# Patient Record
Sex: MALE | Race: WHITE | NOT HISPANIC OR LATINO | Employment: FULL TIME | URBAN - METROPOLITAN AREA
[De-identification: names, ages, dates, MRNs, and addresses within clinical notes are randomized per-mention and may not be internally consistent; named-entity substitution may affect disease eponyms.]

---

## 2018-05-05 ENCOUNTER — HOSPITAL ENCOUNTER (OUTPATIENT)
Facility: HOSPITAL | Age: 48
Discharge: HOME | End: 2018-05-06
Attending: EMERGENCY MEDICINE | Admitting: INTERNAL MEDICINE
Payer: COMMERCIAL

## 2018-05-05 DIAGNOSIS — R07.9 CHEST PAIN: ICD-10-CM

## 2018-05-05 DIAGNOSIS — Z72.0 TOBACCO ABUSE: Chronic | ICD-10-CM

## 2018-05-05 DIAGNOSIS — R07.9 ACUTE CHEST PAIN: Primary | ICD-10-CM

## 2018-05-05 DIAGNOSIS — E87.6 HYPOKALEMIA: ICD-10-CM

## 2018-05-05 DIAGNOSIS — R94.31 ST SEGMENT DEPRESSION: ICD-10-CM

## 2018-05-05 LAB
ALBUMIN SERPL BCP-MCNC: 4.3 G/DL
ALP SERPL-CCNC: 74 U/L
ALT SERPL W/O P-5'-P-CCNC: 37 U/L
ANION GAP SERPL CALC-SCNC: 12 MMOL/L
AST SERPL-CCNC: 67 U/L
BASOPHILS # BLD AUTO: 0.03 K/UL
BASOPHILS NFR BLD: 0.3 %
BILIRUB SERPL-MCNC: 0.4 MG/DL
BUN SERPL-MCNC: 16 MG/DL
CALCIUM SERPL-MCNC: 9.4 MG/DL
CHLORIDE SERPL-SCNC: 107 MMOL/L
CO2 SERPL-SCNC: 25 MMOL/L
CREAT SERPL-MCNC: 1.4 MG/DL
D DIMER PPP IA.FEU-MCNC: 0.26 MG/L FEU
DIFFERENTIAL METHOD: ABNORMAL
EOSINOPHIL # BLD AUTO: 0.7 K/UL
EOSINOPHIL NFR BLD: 6.1 %
ERYTHROCYTE [DISTWIDTH] IN BLOOD BY AUTOMATED COUNT: 12.2 %
EST. GFR  (AFRICAN AMERICAN): >60 ML/MIN/1.73 M^2
EST. GFR  (NON AFRICAN AMERICAN): >60 ML/MIN/1.73 M^2
GLUCOSE SERPL-MCNC: 168 MG/DL
HCT VFR BLD AUTO: 42.9 %
HGB BLD-MCNC: 15.1 G/DL
LYMPHOCYTES # BLD AUTO: 4.1 K/UL
LYMPHOCYTES NFR BLD: 37 %
MCH RBC QN AUTO: 30.6 PG
MCHC RBC AUTO-ENTMCNC: 35.2 G/DL
MCV RBC AUTO: 87 FL
MONOCYTES # BLD AUTO: 0.8 K/UL
MONOCYTES NFR BLD: 7.4 %
NEUTROPHILS # BLD AUTO: 5.4 K/UL
NEUTROPHILS NFR BLD: 49 %
PLATELET # BLD AUTO: 307 K/UL
PMV BLD AUTO: 9.2 FL
POTASSIUM SERPL-SCNC: 3.2 MMOL/L
PROT SERPL-MCNC: 6.9 G/DL
RBC # BLD AUTO: 4.94 M/UL
SODIUM SERPL-SCNC: 144 MMOL/L
TROPONIN I SERPL DL<=0.01 NG/ML-MCNC: <0.006 NG/ML
WBC # BLD AUTO: 11.08 K/UL

## 2018-05-05 PROCEDURE — 25000003 PHARM REV CODE 250: Performed by: EMERGENCY MEDICINE

## 2018-05-05 PROCEDURE — 85025 COMPLETE CBC W/AUTO DIFF WBC: CPT

## 2018-05-05 PROCEDURE — 84484 ASSAY OF TROPONIN QUANT: CPT

## 2018-05-05 PROCEDURE — 85379 FIBRIN DEGRADATION QUANT: CPT

## 2018-05-05 PROCEDURE — 99284 EMERGENCY DEPT VISIT MOD MDM: CPT | Mod: 25

## 2018-05-05 PROCEDURE — 93010 ELECTROCARDIOGRAM REPORT: CPT | Mod: 76,,, | Performed by: INTERNAL MEDICINE

## 2018-05-05 PROCEDURE — 93005 ELECTROCARDIOGRAM TRACING: CPT

## 2018-05-05 PROCEDURE — 36000 PLACE NEEDLE IN VEIN: CPT

## 2018-05-05 PROCEDURE — 80053 COMPREHEN METABOLIC PANEL: CPT

## 2018-05-05 PROCEDURE — G0378 HOSPITAL OBSERVATION PER HR: HCPCS

## 2018-05-05 RX ORDER — POTASSIUM CHLORIDE 20 MEQ/1
20 TABLET, EXTENDED RELEASE ORAL
Status: COMPLETED | OUTPATIENT
Start: 2018-05-06 | End: 2018-05-06

## 2018-05-05 RX ORDER — ASPIRIN 325 MG
325 TABLET ORAL
Status: COMPLETED | OUTPATIENT
Start: 2018-05-05 | End: 2018-05-05

## 2018-05-05 RX ORDER — METOPROLOL TARTRATE 25 MG/1
25 TABLET, FILM COATED ORAL
Status: COMPLETED | OUTPATIENT
Start: 2018-05-06 | End: 2018-05-06

## 2018-05-05 RX ADMIN — ASPIRIN 325 MG ORAL TABLET 325 MG: 325 PILL ORAL at 10:05

## 2018-05-06 VITALS
BODY MASS INDEX: 30.47 KG/M2 | WEIGHT: 205.69 LBS | DIASTOLIC BLOOD PRESSURE: 62 MMHG | RESPIRATION RATE: 16 BRPM | HEART RATE: 86 BPM | SYSTOLIC BLOOD PRESSURE: 109 MMHG | TEMPERATURE: 98 F | OXYGEN SATURATION: 95 % | HEIGHT: 69 IN

## 2018-05-06 PROBLEM — Z72.0 TOBACCO ABUSE: Chronic | Status: ACTIVE | Noted: 2018-05-06

## 2018-05-06 PROBLEM — M94.0 COSTOCHONDRITIS: Status: ACTIVE | Noted: 2018-05-06

## 2018-05-06 PROBLEM — R07.9 ACUTE CHEST PAIN: Status: ACTIVE | Noted: 2018-05-05

## 2018-05-06 PROBLEM — E87.6 HYPOKALEMIA: Status: ACTIVE | Noted: 2018-05-06

## 2018-05-06 LAB
ALBUMIN SERPL BCP-MCNC: 3.8 G/DL
ALP SERPL-CCNC: 67 U/L
ALT SERPL W/O P-5'-P-CCNC: 29 U/L
ANION GAP SERPL CALC-SCNC: 9 MMOL/L
AST SERPL-CCNC: 48 U/L
BASOPHILS # BLD AUTO: 0.03 K/UL
BASOPHILS NFR BLD: 0.3 %
BILIRUB SERPL-MCNC: 0.3 MG/DL
BUN SERPL-MCNC: 17 MG/DL
CALCIUM SERPL-MCNC: 9 MG/DL
CHLORIDE SERPL-SCNC: 109 MMOL/L
CHOLEST SERPL-MCNC: 183 MG/DL
CHOLEST/HDLC SERPL: 6.3 {RATIO}
CO2 SERPL-SCNC: 23 MMOL/L
CREAT SERPL-MCNC: 1 MG/DL
DIFFERENTIAL METHOD: NORMAL
EOSINOPHIL # BLD AUTO: 0.3 K/UL
EOSINOPHIL NFR BLD: 3.5 %
ERYTHROCYTE [DISTWIDTH] IN BLOOD BY AUTOMATED COUNT: 12.3 %
EST. GFR  (AFRICAN AMERICAN): >60 ML/MIN/1.73 M^2
EST. GFR  (NON AFRICAN AMERICAN): >60 ML/MIN/1.73 M^2
GLUCOSE SERPL-MCNC: 120 MG/DL
HCT VFR BLD AUTO: 42.6 %
HDLC SERPL-MCNC: 29 MG/DL
HDLC SERPL: 15.8 %
HGB BLD-MCNC: 14.3 G/DL
LDLC SERPL CALC-MCNC: 126.4 MG/DL
LYMPHOCYTES # BLD AUTO: 3.1 K/UL
LYMPHOCYTES NFR BLD: 31.3 %
MAGNESIUM SERPL-MCNC: 2.3 MG/DL
MCH RBC QN AUTO: 29.5 PG
MCHC RBC AUTO-ENTMCNC: 33.6 G/DL
MCV RBC AUTO: 88 FL
MONOCYTES # BLD AUTO: 0.5 K/UL
MONOCYTES NFR BLD: 4.6 %
NEUTROPHILS # BLD AUTO: 5.9 K/UL
NEUTROPHILS NFR BLD: 60.2 %
NONHDLC SERPL-MCNC: 154 MG/DL
PHOSPHATE SERPL-MCNC: 4.3 MG/DL
PLATELET # BLD AUTO: 273 K/UL
PMV BLD AUTO: 9.2 FL
POTASSIUM SERPL-SCNC: 4 MMOL/L
PROT SERPL-MCNC: 6.3 G/DL
RBC # BLD AUTO: 4.85 M/UL
SODIUM SERPL-SCNC: 141 MMOL/L
TRIGL SERPL-MCNC: 138 MG/DL
TROPONIN I SERPL DL<=0.01 NG/ML-MCNC: 0.01 NG/ML
TROPONIN I SERPL DL<=0.01 NG/ML-MCNC: <0.006 NG/ML
WBC # BLD AUTO: 9.81 K/UL

## 2018-05-06 PROCEDURE — 36415 COLL VENOUS BLD VENIPUNCTURE: CPT

## 2018-05-06 PROCEDURE — 80053 COMPREHEN METABOLIC PANEL: CPT

## 2018-05-06 PROCEDURE — 93005 ELECTROCARDIOGRAM TRACING: CPT

## 2018-05-06 PROCEDURE — 83735 ASSAY OF MAGNESIUM: CPT

## 2018-05-06 PROCEDURE — G0378 HOSPITAL OBSERVATION PER HR: HCPCS

## 2018-05-06 PROCEDURE — 84100 ASSAY OF PHOSPHORUS: CPT

## 2018-05-06 PROCEDURE — 84484 ASSAY OF TROPONIN QUANT: CPT

## 2018-05-06 PROCEDURE — 84484 ASSAY OF TROPONIN QUANT: CPT | Mod: 91

## 2018-05-06 PROCEDURE — 85025 COMPLETE CBC W/AUTO DIFF WBC: CPT

## 2018-05-06 PROCEDURE — 80061 LIPID PANEL: CPT

## 2018-05-06 PROCEDURE — 93010 ELECTROCARDIOGRAM REPORT: CPT | Mod: ,,, | Performed by: INTERNAL MEDICINE

## 2018-05-06 PROCEDURE — 25000003 PHARM REV CODE 250: Performed by: EMERGENCY MEDICINE

## 2018-05-06 RX ORDER — ACETAMINOPHEN 325 MG/1
650 TABLET ORAL EVERY 6 HOURS PRN
Status: DISCONTINUED | OUTPATIENT
Start: 2018-05-06 | End: 2018-05-06

## 2018-05-06 RX ORDER — ONDANSETRON 8 MG/1
8 TABLET, ORALLY DISINTEGRATING ORAL EVERY 8 HOURS PRN
Status: DISCONTINUED | OUTPATIENT
Start: 2018-05-06 | End: 2018-05-06 | Stop reason: HOSPADM

## 2018-05-06 RX ORDER — RAMELTEON 8 MG/1
8 TABLET ORAL NIGHTLY PRN
Status: DISCONTINUED | OUTPATIENT
Start: 2018-05-06 | End: 2018-05-06 | Stop reason: HOSPADM

## 2018-05-06 RX ORDER — ACETAMINOPHEN 325 MG/1
650 TABLET ORAL EVERY 4 HOURS PRN
Status: DISCONTINUED | OUTPATIENT
Start: 2018-05-06 | End: 2018-05-06

## 2018-05-06 RX ORDER — SODIUM CHLORIDE 0.9 % (FLUSH) 0.9 %
5 SYRINGE (ML) INJECTION
Status: DISCONTINUED | OUTPATIENT
Start: 2018-05-06 | End: 2018-05-06

## 2018-05-06 RX ORDER — IBUPROFEN 200 MG
1 TABLET ORAL DAILY
Status: DISCONTINUED | OUTPATIENT
Start: 2018-05-06 | End: 2018-05-06 | Stop reason: HOSPADM

## 2018-05-06 RX ORDER — ASPIRIN 325 MG
325 TABLET ORAL DAILY
Status: DISCONTINUED | OUTPATIENT
Start: 2018-05-06 | End: 2018-05-06 | Stop reason: HOSPADM

## 2018-05-06 RX ORDER — ACETAMINOPHEN 500 MG
500 TABLET ORAL EVERY 6 HOURS PRN
Status: DISCONTINUED | OUTPATIENT
Start: 2018-05-06 | End: 2018-05-06 | Stop reason: HOSPADM

## 2018-05-06 RX ORDER — ENOXAPARIN SODIUM 100 MG/ML
40 INJECTION SUBCUTANEOUS EVERY 24 HOURS
Status: DISCONTINUED | OUTPATIENT
Start: 2018-05-06 | End: 2018-05-06 | Stop reason: HOSPADM

## 2018-05-06 RX ORDER — AMOXICILLIN 250 MG
1 CAPSULE ORAL 2 TIMES DAILY PRN
Status: DISCONTINUED | OUTPATIENT
Start: 2018-05-06 | End: 2018-05-06 | Stop reason: HOSPADM

## 2018-05-06 RX ORDER — NITROGLYCERIN 0.4 MG/1
0.4 TABLET SUBLINGUAL EVERY 5 MIN PRN
Status: DISCONTINUED | OUTPATIENT
Start: 2018-05-06 | End: 2018-05-06 | Stop reason: HOSPADM

## 2018-05-06 RX ADMIN — METOPROLOL TARTRATE 25 MG: 25 TABLET ORAL at 12:05

## 2018-05-06 RX ADMIN — POTASSIUM CHLORIDE 20 MEQ: 1500 TABLET, EXTENDED RELEASE ORAL at 12:05

## 2018-05-06 NOTE — PLAN OF CARE
05/06/18 0949   Final Note   Assessment Type Final Discharge Note   Discharge Disposition Home   What phone number can be called within the next 1-3 days to see how you are doing after discharge? ([phone] )   Hospital Follow Up  Appt(s) scheduled? No  (Unable to schedule)   Discharge plans and expectations educations in teach back method with documentation complete? Yes   Right Care Referral Info   Post Acute Recommendation No Care

## 2018-05-06 NOTE — DISCHARGE SUMMARY
"[facility] Hospital Medicine  Discharge Summary      Patient Name: Roni Keen  MRN: [id number]  Admission Date: 2018  Hospital Length of Stay: 0 days  Discharge Date and Time:  2018 9:30 AM  Attending Physician: Whit Khan MD   Discharging Provider: CHEN Maxwell  Primary Care Provider: Jamie Malik MD      HPI:   Mr. Roni Keen is a 47 y.o. male with tobacco abuse who presents to [facility] ED with complaints of chest pain today.  The pain was left-sided without radiation, was sharp in quality, and was 5/10 in severity at its worst.  It started while he was driving and only lasted a few moments before resolving without intervention.  He felt some palpitations and dyspnea afterwards but denies any nausea, vomiting, fevers, chills, diaphoresis, hemoptysis, nor any lower extremity pain or swelling.  He denies any exacerbating or alleviating factors, and has never had similar issues in the past.  He does not have a personal history of heart disease but does say that it runs in his mother's family.  He did have a brother who  at the age of 45 years from an "enlarged heart" related to his steroid abuse.    * No surgery found *      Hospital Course:   47 y.o.  WM with no CAD history, who is a current everyday smoker with NKMHx who presented for evaluation of acute onset of non-radiating chest pain that was sharp, moderate intensity and exacerbated by light palpation at the bedside. ED was originally concerned about EKG changes in Lead III that appears artifact and subsequent EKGs (X2) have been normal since that time. Patient had serial negative troponin 1 levels other labs grossly normal except glucose is elevated. Lipid panel shows decent control, CXR without acute processes, vitals grossly normal. YVAN score -0-. His symptoms mostly MSK. Patient did mention that he is having periods of anxiety about 3-4 over the past couple of months. We spoke about redirecting " and refocusing. Also counseled against benzos but he states he did not want medication anyway. I have suggested he discuss with primary if the anxiety becomes too debilitating who may be able to start him on something. He was counseled on abnormal glucose and instructed on lifestyle changes. He should follow up with primary care in clinic and Cardiology outpatient.     Consults:     No new Assessment & Plan notes have been filed under this hospital service since the last note was generated.  Service: Hospital Medicine    Final Active Diagnoses:    Diagnosis Date Noted POA    PRINCIPAL PROBLEM:  Costochondritis [M94.0] 05/06/2018 Yes    Hypokalemia [E87.6] 05/06/2018 Yes    Tobacco abuse [Z72.0] 05/06/2018 Yes     Chronic    ST segment depression [R94.31]  Unknown    Acute chest pain [R07.9] 05/05/2018 Yes      Problems Resolved During this Admission:    Diagnosis Date Noted Date Resolved POA       Discharged Condition: stable    Disposition: Home or Self Care    Follow Up:  Follow-up Information     Jamie Malik MD.    Specialty:  Family Medicine  Contact information:  [address] [address]  [facility]  [facility] [id number] [phone]             Abraham Sotomayor MD.    Specialty:  Cardiology  Contact information:  [address] [place] [address] [phone]                 Patient Instructions:     Diet Cardiac     Activity as tolerated         Significant Diagnostic Studies: Labs:   CMP   Recent Labs  Lab 05/05/18 2230 05/06/18  0633    141   K 3.2* 4.0    109   CO2 25 23   * 120*   BUN 16 17   CREATININE 1.4 1.0   CALCIUM 9.4 9.0   PROT 6.9 6.3   ALBUMIN 4.3 3.8   BILITOT 0.4 0.3   ALKPHOS 74 67   AST 67* 48*   ALT 37 29   ANIONGAP 12 9   ESTGFRAFRICA >60 >60   EGFRNONAA >60 >60   , CBC   Recent Labs  Lab 05/05/18 2230 05/06/18  0633   WBC 11.08 9.81   HGB 15.1 14.3   HCT 42.9 42.6    273   , INR No results found for: INR, PROTIME,  Lipid Panel   Lab Results   Component Value Date    CHOL 183 05/06/2018    HDL 29 (L) 05/06/2018    LDLCALC 126.4 05/06/2018    TRIG 138 05/06/2018    CHOLHDL 15.8 (L) 05/06/2018    and Troponin   Recent Labs  Lab 05/06/18  0823   TROPONINI <0.006  <0.006       Pending Diagnostic Studies:     Procedure Component Value Units Date/Time    EKG 12-LEAD [[id number]]     Order Status:  Sent Lab Status:  No result     EKG 12-LEAD [[id number]]     Order Status:  Sent Lab Status:  No result     EKG 12-LEAD [[id number]]     Order Status:  Sent Lab Status:  No result          Medications:  Reconciled Home Medications:      Medication List      You have not been prescribed any medications.         Indwelling Lines/Drains at time of discharge:   Lines/Drains/Airways          No matching active lines, drains, or airways          Time spent on the discharge of patient: 45 minutes  Patient was seen and examined on the date of discharge and determined to be suitable for discharge.         GUI Weber, FNP-C  Hospitalist - Department of Hospital Medicine  [address] [place] [address]  Office [phone]; Pager [phone]

## 2018-05-06 NOTE — HOSPITAL COURSE
47 y.o.  WM with no CAD history, who is a current everyday smoker with NKMHx who presented for evaluation of acute onset of non-radiating chest pain that was sharp, moderate intensity and exacerbated by light palpation at the bedside. ED was originally concerned about EKG changes in Lead III that appears artifact and subsequent EKGs (X2) have been normal since that time. Patient had serial negative troponin 1 levels other labs grossly normal except glucose is elevated. Lipid panel shows decent control, CXR without acute processes, vitals grossly normal. YVAN score -0-. His symptoms mostly MSK. Patient did mention that he is having periods of anxiety about 3-4 over the past couple of months. We spoke about redirecting and refocusing. Also counseled against benzos but he states he did not want medication anyway. I have suggested he discuss with primary if the anxiety becomes too debilitating who may be able to start him on something. He was counseled on abnormal glucose and instructed on lifestyle changes. He should follow up with primary care in clinic and Cardiology outpatient.

## 2018-05-06 NOTE — ED PROVIDER NOTES
"Encounter Date: 2018    SCRIBE #1 NOTE: I, Faithbladimir Vasquez, am scribing for, and in the presence of, Gilberto Echeverria MD.       History     Chief Complaint   Patient presents with    Chest Pain     one incident of sharp chest; pt states he doesn't feel right and wants to get checked out; pt reports checking BP and it was high     CC: Chest Pain    HPI: This 47 y.o male, smoker, presents to the ED for an evaluation of an episode of acute onset left sided chest pain described as a "thump".  Patient also reports of associated dizziness and shortness of breath. Patient reports his symptom began 30 minutes prior to arrival and states they have since resolved.  Patient denies fever, chills, nausea, emesis, diarrhea, abdominal pain, cough, leg pain, leg swelling, nasal congestion, extremity numbness, extremity weakness, or any other associated symptoms.  Patient reports for the past 2 months, he has been having intermittent episodes of anxiety.  Patient reports he typically does not have elevated blood pressures and reports typical readings include 130/89.  Patient reports of a brother who suddenly  at the age of 45 from cardiac related illnesses.  No prior tx.  No alleviating factors.      The history is provided by the patient. No  was used.     Review of patient's allergies indicates:  No Known Allergies  History reviewed. No pertinent past medical history.  History reviewed. No pertinent surgical history.  History reviewed. No pertinent family history.  Social History   Substance Use Topics    Smoking status: Current Every Day Smoker    Smokeless tobacco: Never Used    Alcohol use No     Review of Systems   Constitutional: Negative for chills and fever.   HENT: Negative for ear pain and sore throat.    Eyes: Negative for pain.   Respiratory: Positive for shortness of breath. Negative for cough.    Cardiovascular: Positive for chest pain. Negative for palpitations and leg swelling. " "  Gastrointestinal: Negative for abdominal pain, diarrhea, nausea and vomiting.   Genitourinary: Negative for dysuria.   Musculoskeletal: Negative for back pain and neck pain.        (-) arm or leg problems   Skin: Negative for rash.   Neurological: Positive for dizziness. Negative for weakness, numbness and headaches.       Physical Exam     Initial Vitals [05/05/18 2214]   BP Pulse Resp Temp SpO2   (!) 171/81 106 18 98.1 °F (36.7 °C) 98 %      MAP       111         Physical Exam    Nursing note and vitals reviewed.  Constitutional: Vital signs are normal. He appears well-developed and well-nourished. He is active.  Non-toxic appearance. No distress.   HENT:   Head: Normocephalic and atraumatic.   Eyes: EOM are normal.   Neck: Trachea normal. Neck supple.   Cardiovascular: Regular rhythm and normal heart sounds. Tachycardia present.    Pulmonary/Chest: Breath sounds normal. No respiratory distress.   Abdominal: Soft. Normal appearance and bowel sounds are normal. He exhibits no distension. There is no tenderness.   Musculoskeletal: Normal range of motion. He exhibits no edema.   Neurological: He is alert.   Skin: Skin is warm, dry and intact.   Psychiatric: He has a normal mood and affect.         ED Course   Procedures  Labs Reviewed - No data to display  EKG Readings: (Independently Interpreted)   Sinus tachycardia with a rate of 106. 0.5 mm ST depression in V3-V6. No ST segment elevation. Normal axis. Normal intervals.    11:48 PM 2nd EKG rate 79, normal sinus rhythm, no ST segment elevation or depression, normal intervals normal axis and no ST segment depression that was seen on the prior EKG.          Medical Decision Making:   History:   Old Medical Records: I decided to obtain old medical records.  Initial Assessment:   47 y.o male, smoker, presents to the ED for an evaluation of an episode of acute onset left sided chest pain described as a "thump".  Patient also reports of associated dizziness and " shortness of breath. Patient reports his symptom began 30 minutes prior to arrival and states they have since resolved.   Differential Diagnosis:   Pulmonary embolism, Myocardial infarction, Pneumonia, Pneumothorax, Anxiety, Muscle Strain  Clinical Tests:   Lab Tests: Ordered and Reviewed  Radiological Study: Ordered and Reviewed  Medical Tests: Ordered and Reviewed  ED Management:  While the ED, the patient will be administered a 325 mg ASA. Disposition dependent upon results.  Anticipate no abnormal findings, but will consider possible admission.  The patient has EKG changes that improved with improvement in his symptoms.  He will be admitted for further evaluation stratification of his chest pain. He is chest pain-free at this time.  I will administer him metoprolol for his hypertension.  Spoke with Dr. Tinoco who agrees with the plan.             Scribe Attestation:   Scribe #1: I performed the above scribed service and the documentation accurately describes the services I performed. I attest to the accuracy of the note.    Attending Attestation:           Physician Attestation for Scribe:  Physician Attestation Statement for Scribe #1: I, Gilberto Echeverria MD, reviewed documentation, as scribed by Faith Vasquez in my presence, and it is both accurate and complete.                    Clinical Impression:   The primary encounter diagnosis was ST segment depression. A diagnosis of Chest pain was also pertinent to this visit.                           Gilberto Echeverria MD  05/05/18 7897       Gilberto Echeverria MD  05/05/18 6832

## 2018-05-06 NOTE — NURSING
Pt received from ED assisted by transport via wheelchair. Ambulated from wheelchair to bed and bed locked, lowered, SR up x2. Vitals obtained and documented. Tele monitor initiated. Pt is AAO x4. Resp are even, unlabored, clear on room air. Pt is presenting SR on tele monitor. Abd is contour, active x4 quads and reports having last BM on 5/5. Skin is clean, dry and intact. 18g PIV to RAC saline locked. Pt denied pain at current time and is in NAD. Assessment completed and documented. See doc flow sheet. Plan of care reviewed with pt and pt verbalized understanding. Call light placed within reach. Will continue to monitor pt closely.

## 2018-05-06 NOTE — NURSING
Patient removed IV, catheter intact. No complications to site noted.     Telemetry monitoring discontinued.     Discharge instructions explained and given to patient. Questions and concerns addressed.     Steady gait noted with ambulation. No falls or harm noted during stay.

## 2018-05-06 NOTE — PLAN OF CARE
Problem: Patient Care Overview  Goal: Plan of Care Review   05/06/18 0202   Coping/Psychosocial   Plan Of Care Reviewed With patient   Pt remained free of falls during current shift. Denied pain and did not receive any prn pain medications. Monitoring pt's troponin's every 6 hours which are: <0.006 & 0.008. Pt remained NPO since midnight.  Plan of care and fall precautions reviewed with pt and verbalized understanding. Bed locked, lowered, SR up x2 and call light placed within reach.

## 2018-05-06 NOTE — SUBJECTIVE & OBJECTIVE
History reviewed. No pertinent past medical history.    Past Surgical History:   Procedure Laterality Date    ROTATOR CUFF REPAIR Right        Review of patient's allergies indicates:  No Known Allergies    No current facility-administered medications on file prior to encounter.      No current outpatient prescriptions on file prior to encounter.     Family History     None        Social History Main Topics    Smoking status: Current Every Day Smoker     Packs/day: 1.00    Smokeless tobacco: Never Used    Alcohol use No    Drug use: No    Sexual activity: Not on file     Review of Systems   Constitutional: Negative for activity change, appetite change, chills, diaphoresis, fatigue, fever and unexpected weight change.   HENT: Negative.    Eyes: Negative.    Respiratory: Positive for shortness of breath. Negative for cough, chest tightness and wheezing.    Cardiovascular: Positive for chest pain. Negative for palpitations and leg swelling.   Gastrointestinal: Negative for abdominal distention, abdominal pain, blood in stool, constipation, diarrhea, nausea and vomiting.   Genitourinary: Negative for dysuria and hematuria.   Musculoskeletal: Negative.    Skin: Negative.    Neurological: Negative for dizziness, seizures, syncope, weakness and light-headedness.   Psychiatric/Behavioral: Negative.      Objective:     Vital Signs (Most Recent):  Temp: 98.6 °F (37 °C) (05/06/18 0204)  Pulse: 64 (05/06/18 0204)  Resp: 18 (05/06/18 0204)  BP: 137/84 (05/06/18 0204)  SpO2: 97 % (05/06/18 0204) Vital Signs (24h Range):  Temp:  [98.1 °F (36.7 °C)-98.9 °F (37.2 °C)] 98.6 °F (37 °C)  Pulse:  [] 64  Resp:  [14-18] 18  SpO2:  [95 %-100 %] 97 %  BP: (137-171)/(76-84) 137/84     Weight: 93.3 kg (205 lb 11 oz)  Body mass index is 30.38 kg/m².    Physical Exam   Constitutional: He is oriented to person, place, and time. He appears well-developed and well-nourished. No distress.   HENT:   Head: Normocephalic and atraumatic.    Right Ear: External ear normal.   Left Ear: External ear normal.   Nose: Nose normal.   Eyes: Right eye exhibits no discharge. Left eye exhibits no discharge.   Neck: Normal range of motion.   Cardiovascular: Normal rate, regular rhythm, normal heart sounds and intact distal pulses.  Exam reveals no gallop and no friction rub.    No murmur heard.  Pulmonary/Chest: Effort normal and breath sounds normal. No respiratory distress. He has no wheezes. He has no rales. He exhibits no tenderness.   Abdominal: Soft. Bowel sounds are normal. He exhibits no distension. There is no tenderness. There is no rebound and no guarding.   Musculoskeletal: Normal range of motion. He exhibits no edema.   Neurological: He is alert and oriented to person, place, and time.   Skin: Skin is warm and dry. He is not diaphoretic. No erythema.   Psychiatric: He has a normal mood and affect. His behavior is normal. Judgment and thought content normal.   Nursing note and vitals reviewed.          Significant Labs: All pertinent labs within the past 24 hours have been reviewed.    Significant Imaging: I have reviewed and interpreted all pertinent imaging results/findings within the past 24 hours.

## 2018-05-06 NOTE — ED TRIAGE NOTES
Pt presents to ED with c/o chest pain and reports of generalized illness. Pt reports one incident of stabbing chest pain and reports of generally not feeling well.

## 2018-05-06 NOTE — ASSESSMENT & PLAN NOTE
Patient currently with no chest pain.  HEART score of 2.  Will plan to obtain serial cardiac markers, which thus far have been negative.  Place in observation on telemetry.  There was some concern about anterolateral ST-segment depressions.  I have reviewed both EKGs and it reveals normal sinus rhythm without any ischemic findings.  Chest X-ray is without chest pain mimickers.  Will also administer supplemental oxygen and aspirin.  Will have as-needed nitroglycerin and morphine available.

## 2018-05-06 NOTE — HPI
"Mr. Roni Keen is a 47 y.o. male with tobacco abuse who presents to [facility] ED with complaints of chest pain today.  The pain was left-sided without radiation, was sharp in quality, and was 5/10 in severity at its worst.  It started while he was driving and only lasted a few moments before resolving without intervention.  He felt some palpitations and dyspnea afterwards but denies any nausea, vomiting, fevers, chills, diaphoresis, hemoptysis, nor any lower extremity pain or swelling.  He denies any exacerbating or alleviating factors, and has never had similar issues in the past.  He does not have a personal history of heart disease but does say that it runs in his mother's family.  He did have a brother who  at the age of 45 years from an "enlarged heart" related to his steroid abuse.  "

## 2018-05-06 NOTE — H&P
"[facility] Hospital Medicine  History & Physical    Patient Name: Roni Keen  MRN: [id number]  Admission Date: 2018  Attending Physician: Whit Khan MD   Primary Care Provider: Jamie Malik MD         Patient information was obtained from patient.     Subjective:     Principal Problem:Acute chest pain    Chief Complaint: Chest pain today.    HPI: Mr. Roni Keen is a 47 y.o. male with tobacco abuse who presents to [facility] ED with complaints of chest pain today.  The pain was left-sided without radiation, was sharp in quality, and was 5/10 in severity at its worst.  It started while he was driving and only lasted a few moments before resolving without intervention.  He felt some palpitations and dyspnea afterwards but denies any nausea, vomiting, fevers, chills, diaphoresis, hemoptysis, nor any lower extremity pain or swelling.  He denies any exacerbating or alleviating factors, and has never had similar issues in the past.  He does not have a personal history of heart disease but does say that it runs in his mother's family.  He did have a brother who  at the age of 45 years from an "enlarged heart" related to his steroid abuse.    Chart Review:  Patient has not had any recent hospitalizations within the system.    Outpatient Follow-Up  Date of Visit Physician Service   2015 Jamie Malik MD Primary Care     History reviewed. No pertinent past medical history.    Past Surgical History:   Procedure Laterality Date    ROTATOR CUFF REPAIR Right        Review of patient's allergies indicates:  No Known Allergies    No current facility-administered medications on file prior to encounter.      No current outpatient prescriptions on file prior to encounter.     Family History     None        Social History Main Topics    Smoking status: Current Every Day Smoker     Packs/day: 1.00    Smokeless tobacco: Never Used    Alcohol use No    Drug use: No    Sexual " activity: Not on file     Review of Systems   Constitutional: Negative for activity change, appetite change, chills, diaphoresis, fatigue, fever and unexpected weight change.   HENT: Negative.    Eyes: Negative.    Respiratory: Positive for shortness of breath. Negative for cough, chest tightness and wheezing.    Cardiovascular: Positive for chest pain. Negative for palpitations and leg swelling.   Gastrointestinal: Negative for abdominal distention, abdominal pain, blood in stool, constipation, diarrhea, nausea and vomiting.   Genitourinary: Negative for dysuria and hematuria.   Musculoskeletal: Negative.    Skin: Negative.    Neurological: Negative for dizziness, seizures, syncope, weakness and light-headedness.   Psychiatric/Behavioral: Negative.      Objective:     Vital Signs (Most Recent):  Temp: 98.6 °F (37 °C) (05/06/18 0204)  Pulse: 64 (05/06/18 0204)  Resp: 18 (05/06/18 0204)  BP: 137/84 (05/06/18 0204)  SpO2: 97 % (05/06/18 0204) Vital Signs (24h Range):  Temp:  [98.1 °F (36.7 °C)-98.9 °F (37.2 °C)] 98.6 °F (37 °C)  Pulse:  [] 64  Resp:  [14-18] 18  SpO2:  [95 %-100 %] 97 %  BP: (137-171)/(76-84) 137/84     Weight: 93.3 kg (205 lb 11 oz)  Body mass index is 30.38 kg/m².    Physical Exam   Constitutional: He is oriented to person, place, and time. He appears well-developed and well-nourished. No distress.   HENT:   Head: Normocephalic and atraumatic.   Right Ear: External ear normal.   Left Ear: External ear normal.   Nose: Nose normal.   Eyes: Right eye exhibits no discharge. Left eye exhibits no discharge.   Neck: Normal range of motion.   Cardiovascular: Normal rate, regular rhythm, normal heart sounds and intact distal pulses.  Exam reveals no gallop and no friction rub.    No murmur heard.  Pulmonary/Chest: Effort normal and breath sounds normal. No respiratory distress. He has no wheezes. He has no rales. He exhibits no tenderness.   Abdominal: Soft. Bowel sounds are normal. He exhibits no  distension. There is no tenderness. There is no rebound and no guarding.   Musculoskeletal: Normal range of motion. He exhibits no edema.   Neurological: He is alert and oriented to person, place, and time.   Skin: Skin is warm and dry. He is not diaphoretic. No erythema.   Psychiatric: He has a normal mood and affect. His behavior is normal. Judgment and thought content normal.   Nursing note and vitals reviewed.          Significant Labs: All pertinent labs within the past 24 hours have been reviewed.    Significant Imaging: I have reviewed and interpreted all pertinent imaging results/findings within the past 24 hours.    Assessment/Plan:     * Acute chest pain    Patient currently with no chest pain.  HEART score of 2.  Will plan to obtain serial cardiac markers, which thus far have been negative.  Place in observation on telemetry.  There was some concern about anterolateral ST-segment depressions.  I have reviewed both EKGs and it reveals normal sinus rhythm without any ischemic findings.  Chest X-ray is without chest pain mimickers.  Will also administer supplemental oxygen and aspirin.  Will have as-needed nitroglycerin and morphine available.        Hypokalemia    Will replete orally and recheck his potassium level in the morning.        Tobacco abuse    Patient was counseled on smoking cessation and he will be provided a nicotine transdermal patch applied while inpatient.  Will provide additional smoking cessation counseling prior to discharge.          VTE Risk Mitigation         Ordered     enoxaparin injection 40 mg  Daily      05/06/18 0242     IP VTE HIGH RISK PATIENT  Once      05/06/18 0242           Lizzie Tinoco M.D.  Staff Nocturnist  Department of Hospital Medicine  [facility]  Pager: [phone]

## 2019-10-01 ENCOUNTER — OFFICE VISIT (OUTPATIENT)
Dept: FAMILY MEDICINE | Facility: CLINIC | Age: 49
End: 2019-10-01
Payer: COMMERCIAL

## 2019-10-01 VITALS
BODY MASS INDEX: 31.33 KG/M2 | SYSTOLIC BLOOD PRESSURE: 138 MMHG | WEIGHT: 211.56 LBS | TEMPERATURE: 98 F | RESPIRATION RATE: 18 BRPM | HEIGHT: 69 IN | OXYGEN SATURATION: 96 % | HEART RATE: 79 BPM | DIASTOLIC BLOOD PRESSURE: 86 MMHG

## 2019-10-01 DIAGNOSIS — I10 BENIGN ESSENTIAL HTN: ICD-10-CM

## 2019-10-01 DIAGNOSIS — Z00.00 LABORATORY EXAM ORDERED AS PART OF ROUTINE GENERAL MEDICAL EXAMINATION: ICD-10-CM

## 2019-10-01 DIAGNOSIS — J30.9 ALLERGIC RHINITIS, UNSPECIFIED SEASONALITY, UNSPECIFIED TRIGGER: Primary | ICD-10-CM

## 2019-10-01 DIAGNOSIS — J45.901 EXACERBATION OF ASTHMA, UNSPECIFIED ASTHMA SEVERITY, UNSPECIFIED WHETHER PERSISTENT: ICD-10-CM

## 2019-10-01 DIAGNOSIS — E66.9 OBESITY (BMI 30-39.9): ICD-10-CM

## 2019-10-01 PROCEDURE — 99204 PR OFFICE/OUTPT VISIT, NEW, LEVL IV, 45-59 MIN: ICD-10-PCS | Mod: 25,S$GLB,, | Performed by: NURSE PRACTITIONER

## 2019-10-01 PROCEDURE — 99204 OFFICE O/P NEW MOD 45 MIN: CPT | Mod: 25,S$GLB,, | Performed by: NURSE PRACTITIONER

## 2019-10-01 PROCEDURE — 96372 PR INJECTION,THERAP/PROPH/DIAG2ST, IM OR SUBCUT: ICD-10-PCS | Mod: S$GLB,,, | Performed by: NURSE PRACTITIONER

## 2019-10-01 PROCEDURE — 96372 THER/PROPH/DIAG INJ SC/IM: CPT | Mod: S$GLB,,, | Performed by: NURSE PRACTITIONER

## 2019-10-01 PROCEDURE — 3008F PR BODY MASS INDEX (BMI) DOCUMENTED: ICD-10-PCS | Mod: CPTII,S$GLB,, | Performed by: NURSE PRACTITIONER

## 2019-10-01 PROCEDURE — 3008F BODY MASS INDEX DOCD: CPT | Mod: CPTII,S$GLB,, | Performed by: NURSE PRACTITIONER

## 2019-10-01 RX ORDER — PREDNISONE 20 MG/1
TABLET ORAL
Qty: 18 TABLET | Refills: 0 | Status: SHIPPED | OUTPATIENT
Start: 2019-10-01 | End: 2022-03-02

## 2019-10-01 RX ORDER — OLMESARTAN MEDOXOMIL 20 MG/1
20 TABLET ORAL DAILY
Qty: 90 TABLET | Refills: 3 | Status: SHIPPED | OUTPATIENT
Start: 2019-10-01 | End: 2022-03-02

## 2019-10-01 RX ORDER — BETAMETHASONE SODIUM PHOSPHATE AND BETAMETHASONE ACETATE 3; 3 MG/ML; MG/ML
6 INJECTION, SUSPENSION INTRA-ARTICULAR; INTRALESIONAL; INTRAMUSCULAR; SOFT TISSUE
Status: COMPLETED | OUTPATIENT
Start: 2019-10-01 | End: 2019-10-01

## 2019-10-01 RX ORDER — MONTELUKAST SODIUM 10 MG/1
10 TABLET ORAL NIGHTLY
Qty: 30 TABLET | Refills: 4 | Status: SHIPPED | OUTPATIENT
Start: 2019-10-01 | End: 2019-10-31

## 2019-10-01 RX ADMIN — BETAMETHASONE SODIUM PHOSPHATE AND BETAMETHASONE ACETATE 6 MG: 3; 3 INJECTION, SUSPENSION INTRA-ARTICULAR; INTRALESIONAL; INTRAMUSCULAR; SOFT TISSUE at 04:10

## 2019-10-01 NOTE — PROGRESS NOTES
"Subjective:       Patient ID: Roni Keen is a 49 y.o. male.    Chief Complaint: URI    The patient is here with complaints of URI symptoms.  Patient has a long history of asthma and allergies as a child.  He does tell me he has not suffered with asthma as an adult but he has significant allergies all of the time with a constant postnasal drip.  When he was younger he was on immunotherapy.  He is currently taking Claritin daily.  He does tell me that he does monitor his blood pressure at home and it has been elevated.  He often times have spikes up to 170s over 80s to 90s.  He does feel "bad" when his blood pressure is high.  He denies any vision changes.  No chest pain or palpitations.    He does have a history of an elevated cholesterol but tells me he has had this checked again since the last time that is recorded in this chart.  He knows it was still elevated.  He does not exercise on a regular basis.    The 10-year ASCVD risk score (Marionmarni POWERS Jr., et al., 2013) is: 11.5%    Values used to calculate the score:      Age: 49 years      Sex: Male      Is Non- : No      Diabetic: No      Tobacco smoker: Yes      Systolic Blood Pressure: 138 mmHg      Is BP treated: No      HDL Cholesterol: 29 mg/dL      Total Cholesterol: 183 mg/dL    URI    This is a new problem. The current episode started 1 to 4 weeks ago. Associated symptoms include congestion, coughing, headaches, rhinorrhea, sneezing and wheezing. Pertinent negatives include no abdominal pain, chest pain, diarrhea, dysuria, nausea, sore throat or vomiting.     Review of Systems   Constitutional: Positive for appetite change and fatigue.   HENT: Positive for congestion, postnasal drip, rhinorrhea and sneezing. Negative for sinus pressure and sore throat.    Eyes: Negative for pain and redness.   Respiratory: Positive for cough and wheezing. Negative for choking, chest tightness and shortness of breath.    Cardiovascular: Negative for " "chest pain and palpitations.   Gastrointestinal: Negative for abdominal distention, abdominal pain, constipation, diarrhea, nausea and vomiting.   Endocrine: Negative for polydipsia and polyphagia.   Genitourinary: Negative for dysuria and hematuria.   Musculoskeletal: Negative for arthralgias, joint swelling and myalgias.   Skin: Negative for color change and pallor.   Neurological: Positive for headaches. Negative for dizziness and light-headedness.       Past medical, surgical, family and social history reviewed.  Objective:     Vitals:    10/01/19 1549   BP: 138/86   Pulse: 79   Resp: 18   Temp: 98.2 °F (36.8 °C)   TempSrc: Oral   SpO2: 96%   Weight: 95.9 kg (211 lb 8.5 oz)   Height: 5' 9" (1.753 m)   PainSc: 0-No pain     Body mass index is 31.24 kg/m².     Physical Exam   Constitutional: He is oriented to person, place, and time. He appears well-developed and well-nourished.   HENT:   Head: Normocephalic and atraumatic.   Right Ear: External ear normal. Tympanic membrane mobility is abnormal.   Left Ear: External ear normal. Tympanic membrane mobility is abnormal.   Nose: Mucosal edema and rhinorrhea present.   Mouth/Throat: Posterior oropharyngeal edema present. No posterior oropharyngeal erythema.   Eyes: Pupils are equal, round, and reactive to light. Conjunctivae are normal.   Neck: Normal range of motion. Neck supple. No tracheal deviation present.   Cardiovascular: Normal rate and regular rhythm. Exam reveals no gallop and no friction rub.   No murmur heard.  Pulmonary/Chest: Effort normal. No stridor. No respiratory distress. He has wheezes. He has no rales.   Musculoskeletal: Normal range of motion.   Lymphadenopathy:     He has no cervical adenopathy.   Neurological: He is alert and oriented to person, place, and time.   Skin: Skin is warm and dry.   Psychiatric: He has a normal mood and affect. His behavior is normal. Judgment and thought content normal.       Assessment:       1. Allergic rhinitis, " unspecified seasonality, unspecified trigger    2. Exacerbation of asthma, unspecified asthma severity, unspecified whether persistent    3. Laboratory exam ordered as part of routine general medical examination    4. Benign essential HTN    5. Obesity (BMI 30-39.9)        Plan:       Roni was seen today for uri.    Diagnoses and all orders for this visit:    Allergic rhinitis, unspecified seasonality, unspecified trigger  -     montelukast (SINGULAIR) 10 mg tablet; Take 1 tablet (10 mg total) by mouth every evening.    Exacerbation of asthma, unspecified asthma severity, unspecified whether persistent  -     betamethasone acetate-betamethasone sodium phosphate injection 6 mg  -     predniSONE (DELTASONE) 20 MG tablet; Take 3 tablets daily for 3 days, then 2 tablets daily for 3 days, then 1 tablet daily for 3 days    Laboratory exam ordered as part of routine general medical examination  -     CBC auto differential; Future  -     Comprehensive metabolic panel; Future  -     Hemoglobin A1c; Future  -     Lipid panel; Future  -     TSH; Future    Benign essential HTN-new dx  -     olmesartan (BENICAR) 20 MG tablet; Take 1 tablet (20 mg total) by mouth once daily.    Obesity (BMI 30-39.9)    Weight loss discussed.  Patient will follow up with me in the next few months regarding his diagnosis of hypertension.

## 2019-10-01 NOTE — LETTER
October 1, 2019      [facility]  [address] [address] [address]  [facility] [address]  Phone: [phone]  Fax: [phone]       Patient: Roni Keen   YOB: 1970  Date of Visit: 10/01/2019    To Whom It May Concern:    Alex Keen  was at [facility] on 10/01/2019. He may return to work on 10/03/2019 with no restrictions. If you have any questions or concerns, or if I can be of further assistance, please do not hesitate to contact me.    Sincerely,        Rocio Grijalva LPN

## 2019-10-08 ENCOUNTER — LAB VISIT (OUTPATIENT)
Dept: LAB | Facility: HOSPITAL | Age: 49
End: 2019-10-08
Attending: NURSE PRACTITIONER
Payer: COMMERCIAL

## 2019-10-08 DIAGNOSIS — Z00.00 LABORATORY EXAM ORDERED AS PART OF ROUTINE GENERAL MEDICAL EXAMINATION: ICD-10-CM

## 2019-10-08 LAB
ALBUMIN SERPL BCP-MCNC: 4.4 G/DL (ref 3.5–5.2)
ALP SERPL-CCNC: 83 U/L (ref 55–135)
ALT SERPL W/O P-5'-P-CCNC: 29 U/L (ref 10–44)
ANION GAP SERPL CALC-SCNC: 8 MMOL/L (ref 8–16)
AST SERPL-CCNC: 18 U/L (ref 10–40)
BASOPHILS # BLD AUTO: 0.05 K/UL (ref 0–0.2)
BASOPHILS NFR BLD: 0.5 % (ref 0–1.9)
BILIRUB SERPL-MCNC: 0.3 MG/DL (ref 0.1–1)
BUN SERPL-MCNC: 18 MG/DL (ref 6–20)
CALCIUM SERPL-MCNC: 9.7 MG/DL (ref 8.7–10.5)
CHLORIDE SERPL-SCNC: 103 MMOL/L (ref 95–110)
CHOLEST SERPL-MCNC: 221 MG/DL (ref 120–199)
CHOLEST/HDLC SERPL: 5.7 {RATIO} (ref 2–5)
CO2 SERPL-SCNC: 29 MMOL/L (ref 23–29)
CREAT SERPL-MCNC: 1.1 MG/DL (ref 0.5–1.4)
DIFFERENTIAL METHOD: ABNORMAL
EOSINOPHIL # BLD AUTO: 0.4 K/UL (ref 0–0.5)
EOSINOPHIL NFR BLD: 3.6 % (ref 0–8)
ERYTHROCYTE [DISTWIDTH] IN BLOOD BY AUTOMATED COUNT: 12.2 % (ref 11.5–14.5)
EST. GFR  (AFRICAN AMERICAN): >60 ML/MIN/1.73 M^2
EST. GFR  (NON AFRICAN AMERICAN): >60 ML/MIN/1.73 M^2
ESTIMATED AVG GLUCOSE: 151 MG/DL (ref 68–131)
GLUCOSE SERPL-MCNC: 119 MG/DL (ref 70–110)
HBA1C MFR BLD HPLC: 6.9 % (ref 4–5.6)
HCT VFR BLD AUTO: 49.9 % (ref 40–54)
HDLC SERPL-MCNC: 39 MG/DL (ref 40–75)
HDLC SERPL: 17.6 % (ref 20–50)
HGB BLD-MCNC: 16.5 G/DL (ref 14–18)
IMM GRANULOCYTES # BLD AUTO: 0.05 K/UL (ref 0–0.04)
IMM GRANULOCYTES NFR BLD AUTO: 0.5 % (ref 0–0.5)
LDLC SERPL CALC-MCNC: 149.4 MG/DL (ref 63–159)
LYMPHOCYTES # BLD AUTO: 3 K/UL (ref 1–4.8)
LYMPHOCYTES NFR BLD: 30 % (ref 18–48)
MCH RBC QN AUTO: 29.8 PG (ref 27–31)
MCHC RBC AUTO-ENTMCNC: 33.1 G/DL (ref 32–36)
MCV RBC AUTO: 90 FL (ref 82–98)
MONOCYTES # BLD AUTO: 0.6 K/UL (ref 0.3–1)
MONOCYTES NFR BLD: 5.7 % (ref 4–15)
NEUTROPHILS # BLD AUTO: 6 K/UL (ref 1.8–7.7)
NEUTROPHILS NFR BLD: 59.7 % (ref 38–73)
NONHDLC SERPL-MCNC: 182 MG/DL
NRBC BLD-RTO: 0 /100 WBC
PLATELET # BLD AUTO: 304 K/UL (ref 150–350)
PMV BLD AUTO: 9.3 FL (ref 9.2–12.9)
POTASSIUM SERPL-SCNC: 5 MMOL/L (ref 3.5–5.1)
PROT SERPL-MCNC: 7.4 G/DL (ref 6–8.4)
RBC # BLD AUTO: 5.53 M/UL (ref 4.6–6.2)
SODIUM SERPL-SCNC: 140 MMOL/L (ref 136–145)
TRIGL SERPL-MCNC: 163 MG/DL (ref 30–150)
TSH SERPL DL<=0.005 MIU/L-ACNC: 2.36 UIU/ML (ref 0.4–4)
WBC # BLD AUTO: 10 K/UL (ref 3.9–12.7)

## 2019-10-08 PROCEDURE — 84443 ASSAY THYROID STIM HORMONE: CPT

## 2019-10-08 PROCEDURE — 36415 COLL VENOUS BLD VENIPUNCTURE: CPT | Mod: PO

## 2019-10-08 PROCEDURE — 85025 COMPLETE CBC W/AUTO DIFF WBC: CPT

## 2019-10-08 PROCEDURE — 80061 LIPID PANEL: CPT

## 2019-10-08 PROCEDURE — 80053 COMPREHEN METABOLIC PANEL: CPT

## 2019-10-08 PROCEDURE — 83036 HEMOGLOBIN GLYCOSYLATED A1C: CPT

## 2019-10-11 ENCOUNTER — TELEPHONE (OUTPATIENT)
Dept: FAMILY MEDICINE | Facility: CLINIC | Age: 49
End: 2019-10-11

## 2019-10-11 NOTE — TELEPHONE ENCOUNTER
Please book the patient a follow-up visit with me extended appointment to review all blood work.  His hemoglobin A1c was 6.9 which is technically consistent with diabetes but it is controlled.  I would like to review this and discuss it with them.

## 2019-10-11 NOTE — TELEPHONE ENCOUNTER
Advised pt of provider message, verbalized understanding and stated he would call back to schedule an extended appointment

## 2020-12-28 ENCOUNTER — TELEPHONE (OUTPATIENT)
Dept: FAMILY MEDICINE | Facility: CLINIC | Age: 50
End: 2020-12-28

## 2020-12-28 NOTE — TELEPHONE ENCOUNTER
"----- Message from Slim Medina sent at 12/28/2020 12:10 PM CST -----  Regarding: pt wife floridalma  Type: Needs Medical Advice    Who Called:  wife    Best Call Back Number: [phone]   Additional Information: pt needs to see neurologist. Wife wants opinion on "reputable" neurologist. Please call to discuss.      "

## 2020-12-28 NOTE — TELEPHONE ENCOUNTER
Patients wife reports frequent urination at night, etc. Requesting referral for urology. Advised wife patient needs follow up appt, see 10/2019 encounter. Wife will call back for scheduling.

## 2021-01-19 NOTE — PLAN OF CARE
05/06/18 0814   Discharge Assessment   Assessment Type Discharge Planning Assessment   Confirmed/corrected address and phone number on facesheet? Yes   Assessment information obtained from? Patient   Communicated expected length of stay with patient/caregiver no   Prior to hospitilization cognitive status: Alert/Oriented   Prior to hospitalization functional status: Independent   Current cognitive status: Alert/Oriented   Current Functional Status: Independent   Facility Arrived From: [facility]   Lives With spouse   Able to Return to Prior Arrangements yes   Is patient able to care for self after discharge? Yes   Who are your caregiver(s) and their phone number(s)? Spouse-Alida: [phone]   Patient's perception of discharge disposition home or selfcare   Readmission Within The Last 30 Days no previous admission in last 30 days   Patient currently being followed by outpatient case management? No   Patient currently receives any other outside agency services? No   Equipment Currently Used at Home none   Do you have any problems affording any of your prescribed medications? No   Is the patient taking medications as prescribed? yes   Does the patient have transportation home? Yes   Transportation Available car;family or friend will provide   Does the patient receive services at the [facility]? No   Discharge Plan A Home with family   Discharge Plan B Other  (TBD)   Patient/Family In Agreement With Plan yes     [facility]    Independent at home with spouse, who could assist if needed; no assist needed; no current services; no specific needs anticipated; Discharge Plan: Home with spouse.    1)  Warning signs/symptoms information reviewed with and provided to patient in blue folder  2)  Discharge planning begins upon admission   3)  Discussed and reinforced patient responsibility for managing health care at home including:        a) Acquiring prescribed medications; b) following-through with scheduled follow-up  CHIEF COMPLAINT:    Interval Events:    REVIEW OF SYSTEMS:  Constitutional:   Eyes:  ENT:  CV:  Resp:  GI:  :  MSK:  Integumentary:  Neurological:  Psychiatric:  Endocrine:  Hematologic/Lymphatic:  Allergic/Immunologic:  [ ] All other systems negative  [ ] Unable to assess ROS because ________    OBJECTIVE:  ICU Vital Signs Last 24 Hrs  T(C): 36.6 (19 Jan 2021 11:56), Max: 36.6 (19 Jan 2021 11:56)  T(F): 97.9 (19 Jan 2021 11:56), Max: 97.9 (19 Jan 2021 11:56)  HR: 85 (19 Jan 2021 11:56) (72 - 87)  BP: 133/48 (19 Jan 2021 11:56) (129/61 - 162/91)  BP(mean): --  ABP: --  ABP(mean): --  RR: 21 (19 Jan 2021 11:56) (17 - 21)  SpO2: 100% (19 Jan 2021 11:56) (100% - 100%)    Mode: AC/ CMV (Assist Control/ Continuous Mandatory Ventilation), RR (machine): 12, TV (machine): 450, FiO2: 40, PEEP: 5, MAP: 13, PIP: 30    01-18 @ 07:01  -  01-19 @ 07:00  --------------------------------------------------------  IN: 2020 mL / OUT: 2200 mL / NET: -180 mL      CAPILLARY BLOOD GLUCOSE      POCT Blood Glucose.: 128 mg/dL (19 Jan 2021 12:17)      PHYSICAL EXAM:  General:   HEENT:   Lymph Nodes:  Neck:   Respiratory:   Cardiovascular:   Abdomen:   Extremities:   Skin:   Neurological:  Psychiatry:    HOSPITAL MEDICATIONS:  MEDICATIONS  (STANDING):  amLODIPine   Tablet 5 milliGRAM(s) Oral daily  ascorbic acid 500 milliGRAM(s) Oral daily  chlorhexidine 4% Liquid 1 Application(s) Topical daily  collagenase Ointment 1 Application(s) Topical two times a day  enoxaparin Injectable 100 milliGRAM(s) SubCutaneous every 12 hours  insulin lispro (ADMELOG) corrective regimen sliding scale   SubCutaneous every 6 hours  lactobacillus acidophilus 1 Tablet(s) Oral two times a day  metoprolol tartrate 50 milliGRAM(s) Oral two times a day  multivitamin 1 Tablet(s) Oral daily  pantoprazole  Injectable 40 milliGRAM(s) IV Push daily  psyllium Powder 1 Packet(s) Oral two times a day  zinc sulfate 220 milliGRAM(s) Oral daily    MEDICATIONS  (PRN):  acetaminophen    Suspension .. 650 milliGRAM(s) Oral every 6 hours PRN Temp greater or equal to 38C (100.4F), Mild Pain (1 - 3), Moderate Pain (4 - 6)      LABS:                        8.5    9.00  )-----------( 304      ( 19 Jan 2021 04:58 )             29.3     01-19    136  |  99  |  4<L>  ----------------------------<  114<H>  3.5   |  29  |  <0.20<L>    Ca    8.7      19 Jan 2021 04:39  Phos  3.1     01-19  Mg     1.6     01-19                MICROBIOLOGY:     RADIOLOGY:  [ ] Reviewed and interpreted by me    PULMONARY FUNCTION TESTS:    EKG: appointments or scheduling those that could not be set; and c) monitoring health at home.       CHIEF COMPLAINT: Patient is a 61y old  Female who presents with a chief complaint of Transfer from [facility] (19 Jan 2021 12:47)      Interval Events: Pt seen and evaluated by team at bedside     REVIEW OF SYSTEMS:  Constitutional: No fevers /chills   CV: Denies   Resp: Denies   GI: Denies   MSK: Denies   [ ] All other systems negative    OBJECTIVE:  ICU Vital Signs Last 24 Hrs  T(C): 36.6 (19 Jan 2021 11:56), Max: 36.6 (19 Jan 2021 11:56)  T(F): 97.9 (19 Jan 2021 11:56), Max: 97.9 (19 Jan 2021 11:56)  HR: 85 (19 Jan 2021 11:56) (72 - 87)  BP: 133/48 (19 Jan 2021 11:56) (129/61 - 162/91)  BP(mean): --  ABP: --  ABP(mean): --  RR: 21 (19 Jan 2021 11:56) (17 - 21)  SpO2: 100% (19 Jan 2021 11:56) (100% - 100%)    Mode: AC/ CMV (Assist Control/ Continuous Mandatory Ventilation), RR (machine): 12, TV (machine): 450, FiO2: 40, PEEP: 5, MAP: 13, PIP: 30    01-18 @ 07:01  -  01-19 @ 07:00  --------------------------------------------------------  IN: 2020 mL / OUT: 2200 mL / NET: -180 mL      CAPILLARY BLOOD GLUCOSE      POCT Blood Glucose.: 128 mg/dL (19 Jan 2021 12:17)      HOSPITAL MEDICATIONS:  MEDICATIONS  (STANDING):  amLODIPine   Tablet 5 milliGRAM(s) Oral daily  ascorbic acid 500 milliGRAM(s) Oral daily  chlorhexidine 4% Liquid 1 Application(s) Topical daily  collagenase Ointment 1 Application(s) Topical two times a day  enoxaparin Injectable 100 milliGRAM(s) SubCutaneous every 12 hours  insulin lispro (ADMELOG) corrective regimen sliding scale   SubCutaneous every 6 hours  lactobacillus acidophilus 1 Tablet(s) Oral two times a day  metoprolol tartrate 50 milliGRAM(s) Oral two times a day  multivitamin 1 Tablet(s) Oral daily  pantoprazole  Injectable 40 milliGRAM(s) IV Push daily  psyllium Powder 1 Packet(s) Oral two times a day  zinc sulfate 220 milliGRAM(s) Oral daily    MEDICATIONS  (PRN):  acetaminophen    Suspension .. 650 milliGRAM(s) Oral every 6 hours PRN Temp greater or equal to 38C (100.4F), Mild Pain (1 - 3), Moderate Pain (4 - 6)      LABS:                        8.5    9.00  )-----------( 304      ( 19 Jan 2021 04:58 )             29.3     01-19    136  |  99  |  4<L>  ----------------------------<  114<H>  3.5   |  29  |  <0.20<L>    Ca    8.7      19 Jan 2021 04:39  Phos  3.1     01-19  Mg     1.6     01-19                MICROBIOLOGY:     RADIOLOGY:  [ ] Reviewed and interpreted by me    PULMONARY FUNCTION TESTS:    EKG:

## 2022-03-02 ENCOUNTER — LAB VISIT (OUTPATIENT)
Dept: LAB | Facility: HOSPITAL | Age: 52
End: 2022-03-02
Attending: INTERNAL MEDICINE
Payer: COMMERCIAL

## 2022-03-02 ENCOUNTER — OFFICE VISIT (OUTPATIENT)
Dept: ENDOCRINOLOGY | Facility: CLINIC | Age: 52
End: 2022-03-02
Payer: COMMERCIAL

## 2022-03-02 VITALS
HEIGHT: 69 IN | BODY MASS INDEX: 30.76 KG/M2 | HEART RATE: 73 BPM | WEIGHT: 207.69 LBS | DIASTOLIC BLOOD PRESSURE: 86 MMHG | SYSTOLIC BLOOD PRESSURE: 134 MMHG | OXYGEN SATURATION: 98 %

## 2022-03-02 DIAGNOSIS — E11.65 TYPE 2 DIABETES MELLITUS WITH HYPERGLYCEMIA, WITH LONG-TERM CURRENT USE OF INSULIN: Primary | ICD-10-CM

## 2022-03-02 DIAGNOSIS — E66.09 CLASS 1 OBESITY DUE TO EXCESS CALORIES WITH SERIOUS COMORBIDITY AND BODY MASS INDEX (BMI) OF 30.0 TO 30.9 IN ADULT: ICD-10-CM

## 2022-03-02 DIAGNOSIS — E78.2 MIXED HYPERLIPIDEMIA: ICD-10-CM

## 2022-03-02 DIAGNOSIS — Z79.4 TYPE 2 DIABETES MELLITUS WITH HYPERGLYCEMIA, WITH LONG-TERM CURRENT USE OF INSULIN: Primary | ICD-10-CM

## 2022-03-02 DIAGNOSIS — Z79.4 TYPE 2 DIABETES MELLITUS WITH HYPERGLYCEMIA, WITH LONG-TERM CURRENT USE OF INSULIN: ICD-10-CM

## 2022-03-02 DIAGNOSIS — E11.65 TYPE 2 DIABETES MELLITUS WITH HYPERGLYCEMIA, WITH LONG-TERM CURRENT USE OF INSULIN: ICD-10-CM

## 2022-03-02 LAB
ALBUMIN SERPL BCP-MCNC: 4.3 G/DL (ref 3.5–5.2)
ALP SERPL-CCNC: 77 U/L (ref 55–135)
ALT SERPL W/O P-5'-P-CCNC: 34 U/L (ref 10–44)
ANION GAP SERPL CALC-SCNC: 11 MMOL/L (ref 8–16)
AST SERPL-CCNC: 30 U/L (ref 10–40)
BASOPHILS # BLD AUTO: 0.06 K/UL (ref 0–0.2)
BASOPHILS NFR BLD: 0.8 % (ref 0–1.9)
BILIRUB SERPL-MCNC: 0.7 MG/DL (ref 0.1–1)
BUN SERPL-MCNC: 14 MG/DL (ref 6–20)
CALCIUM SERPL-MCNC: 10.1 MG/DL (ref 8.7–10.5)
CHLORIDE SERPL-SCNC: 102 MMOL/L (ref 95–110)
CHOLEST SERPL-MCNC: 158 MG/DL (ref 120–199)
CHOLEST/HDLC SERPL: 4.9 {RATIO} (ref 2–5)
CO2 SERPL-SCNC: 28 MMOL/L (ref 23–29)
CREAT SERPL-MCNC: 1 MG/DL (ref 0.5–1.4)
DIFFERENTIAL METHOD: NORMAL
EOSINOPHIL # BLD AUTO: 0.5 K/UL (ref 0–0.5)
EOSINOPHIL NFR BLD: 6 % (ref 0–8)
ERYTHROCYTE [DISTWIDTH] IN BLOOD BY AUTOMATED COUNT: 11.7 % (ref 11.5–14.5)
EST. GFR  (AFRICAN AMERICAN): >60 ML/MIN/1.73 M^2
EST. GFR  (NON AFRICAN AMERICAN): >60 ML/MIN/1.73 M^2
ESTIMATED AVG GLUCOSE: 246 MG/DL (ref 68–131)
GLUCOSE SERPL-MCNC: 233 MG/DL (ref 70–110)
HBA1C MFR BLD: 10.2 % (ref 4–5.6)
HCT VFR BLD AUTO: 45.6 % (ref 40–54)
HDLC SERPL-MCNC: 32 MG/DL (ref 40–75)
HDLC SERPL: 20.3 % (ref 20–50)
HGB BLD-MCNC: 15.2 G/DL (ref 14–18)
IMM GRANULOCYTES # BLD AUTO: 0.02 K/UL (ref 0–0.04)
IMM GRANULOCYTES NFR BLD AUTO: 0.3 % (ref 0–0.5)
LDLC SERPL CALC-MCNC: 97 MG/DL (ref 63–159)
LYMPHOCYTES # BLD AUTO: 2.3 K/UL (ref 1–4.8)
LYMPHOCYTES NFR BLD: 29.6 % (ref 18–48)
MCH RBC QN AUTO: 28.6 PG (ref 27–31)
MCHC RBC AUTO-ENTMCNC: 33.3 G/DL (ref 32–36)
MCV RBC AUTO: 86 FL (ref 82–98)
MONOCYTES # BLD AUTO: 0.5 K/UL (ref 0.3–1)
MONOCYTES NFR BLD: 6.6 % (ref 4–15)
NEUTROPHILS # BLD AUTO: 4.4 K/UL (ref 1.8–7.7)
NEUTROPHILS NFR BLD: 56.7 % (ref 38–73)
NONHDLC SERPL-MCNC: 126 MG/DL
NRBC BLD-RTO: 0 /100 WBC
PLATELET # BLD AUTO: 283 K/UL (ref 150–450)
PMV BLD AUTO: 9.2 FL (ref 9.2–12.9)
POTASSIUM SERPL-SCNC: 4.6 MMOL/L (ref 3.5–5.1)
PROT SERPL-MCNC: 7 G/DL (ref 6–8.4)
RBC # BLD AUTO: 5.31 M/UL (ref 4.6–6.2)
SODIUM SERPL-SCNC: 141 MMOL/L (ref 136–145)
TRIGL SERPL-MCNC: 145 MG/DL (ref 30–150)
WBC # BLD AUTO: 7.73 K/UL (ref 3.9–12.7)

## 2022-03-02 PROCEDURE — 3046F PR MOST RECENT HEMOGLOBIN A1C LEVEL > 9.0%: ICD-10-PCS | Mod: CPTII,S$GLB,, | Performed by: INTERNAL MEDICINE

## 2022-03-02 PROCEDURE — 3079F PR MOST RECENT DIASTOLIC BLOOD PRESSURE 80-89 MM HG: ICD-10-PCS | Mod: CPTII,S$GLB,, | Performed by: INTERNAL MEDICINE

## 2022-03-02 PROCEDURE — 3008F BODY MASS INDEX DOCD: CPT | Mod: CPTII,S$GLB,, | Performed by: INTERNAL MEDICINE

## 2022-03-02 PROCEDURE — 3079F DIAST BP 80-89 MM HG: CPT | Mod: CPTII,S$GLB,, | Performed by: INTERNAL MEDICINE

## 2022-03-02 PROCEDURE — 1160F PR REVIEW ALL MEDS BY PRESCRIBER/CLIN PHARMACIST DOCUMENTED: ICD-10-PCS | Mod: CPTII,S$GLB,, | Performed by: INTERNAL MEDICINE

## 2022-03-02 PROCEDURE — 83036 HEMOGLOBIN GLYCOSYLATED A1C: CPT | Performed by: INTERNAL MEDICINE

## 2022-03-02 PROCEDURE — 85025 COMPLETE CBC W/AUTO DIFF WBC: CPT | Performed by: INTERNAL MEDICINE

## 2022-03-02 PROCEDURE — 3046F HEMOGLOBIN A1C LEVEL >9.0%: CPT | Mod: CPTII,S$GLB,, | Performed by: INTERNAL MEDICINE

## 2022-03-02 PROCEDURE — 3075F SYST BP GE 130 - 139MM HG: CPT | Mod: CPTII,S$GLB,, | Performed by: INTERNAL MEDICINE

## 2022-03-02 PROCEDURE — 99204 PR OFFICE/OUTPT VISIT, NEW, LEVL IV, 45-59 MIN: ICD-10-PCS | Mod: S$GLB,,, | Performed by: INTERNAL MEDICINE

## 2022-03-02 PROCEDURE — 99999 PR PBB SHADOW E&M-EST. PATIENT-LVL III: ICD-10-PCS | Mod: PBBFAC,,, | Performed by: INTERNAL MEDICINE

## 2022-03-02 PROCEDURE — 36415 COLL VENOUS BLD VENIPUNCTURE: CPT | Mod: PO | Performed by: INTERNAL MEDICINE

## 2022-03-02 PROCEDURE — 99999 PR PBB SHADOW E&M-EST. PATIENT-LVL III: CPT | Mod: PBBFAC,,, | Performed by: INTERNAL MEDICINE

## 2022-03-02 PROCEDURE — 1160F RVW MEDS BY RX/DR IN RCRD: CPT | Mod: CPTII,S$GLB,, | Performed by: INTERNAL MEDICINE

## 2022-03-02 PROCEDURE — 99204 OFFICE O/P NEW MOD 45 MIN: CPT | Mod: S$GLB,,, | Performed by: INTERNAL MEDICINE

## 2022-03-02 PROCEDURE — 3008F PR BODY MASS INDEX (BMI) DOCUMENTED: ICD-10-PCS | Mod: CPTII,S$GLB,, | Performed by: INTERNAL MEDICINE

## 2022-03-02 PROCEDURE — 3075F PR MOST RECENT SYSTOLIC BLOOD PRESS GE 130-139MM HG: ICD-10-PCS | Mod: CPTII,S$GLB,, | Performed by: INTERNAL MEDICINE

## 2022-03-02 PROCEDURE — 80053 COMPREHEN METABOLIC PANEL: CPT | Performed by: INTERNAL MEDICINE

## 2022-03-02 PROCEDURE — 1159F PR MEDICATION LIST DOCUMENTED IN MEDICAL RECORD: ICD-10-PCS | Mod: CPTII,S$GLB,, | Performed by: INTERNAL MEDICINE

## 2022-03-02 PROCEDURE — 1159F MED LIST DOCD IN RCRD: CPT | Mod: CPTII,S$GLB,, | Performed by: INTERNAL MEDICINE

## 2022-03-02 PROCEDURE — 80061 LIPID PANEL: CPT | Performed by: INTERNAL MEDICINE

## 2022-03-02 RX ORDER — OMEPRAZOLE 40 MG/1
40 CAPSULE, DELAYED RELEASE ORAL DAILY
COMMUNITY
Start: 2022-01-31 | End: 2022-03-11 | Stop reason: SDUPTHER

## 2022-03-02 RX ORDER — METFORMIN HYDROCHLORIDE 500 MG/1
1000 TABLET, EXTENDED RELEASE ORAL 2 TIMES DAILY
COMMUNITY
Start: 2022-01-31 | End: 2022-03-03 | Stop reason: SDUPTHER

## 2022-03-02 RX ORDER — ROSUVASTATIN CALCIUM 20 MG/1
20 TABLET, COATED ORAL DAILY
COMMUNITY
Start: 2022-01-31 | End: 2022-03-03 | Stop reason: SDUPTHER

## 2022-03-02 NOTE — PROGRESS NOTES
"    Subjective:    Patient ID:  Roni Keen is a 51 y.o. male.    Chief Complaint:  Diabetes      Pt presents to establish care for T2 DM and review of chronic medical conditions as listed in the visit diagnosis section of this encounter.     With regards to the diabetes:  DX'ed several years ago by PCP. PCP is located on the [place]    Wants to make sure his PCP is not missing anything and wants to be on the best therapy for his diabetes.    Known diabetic complications: cardiovascular disease  History of DKA: no  Cardiovascular risk factors: diabetes mellitus, dyslipidemia, hypertension and male gender    Currently taking:  Metformin 1000 mg BID. No GI sx's. Takes omeprazole which helps with GERD and nausea.     Home blood sugar records:   Fasting 160-180  Pre-lunch 160-180  Pre-dinner -  Bedtime -    Current diet: in general, 2-3 meals a day. Limits sugar. Does sugar free or low carb. Does "green shelf" keto meals.   Current exercise: does regular exercise. Is a .  Any episodes of hypoglycemia? no  Last eye exam: < 1 year ago and no DR  Maddy had diabetes.                Diabetes Management Status    Statin: Not taking  ACE/ARB: Not taking    Screening or Prevention Patient's value Goal Complete/Controlled?   HgA1C Testing and Control   Lab Results   Component Value Date    HGBA1C 10.2 (H) 03/02/2022      Annually/Less than 8% No   Lipid profile : 03/02/2022 Annually No   LDL control Lab Results   Component Value Date    LDLCALC 97.0 03/02/2022    Annually/Less than 100 mg/dl  No   Nephropathy screening Lab Results   Component Value Date    LABMICR 15.0 03/02/2022     No results found for: PROTEINUA Annually No   Blood pressure BP Readings from Last 1 Encounters:   03/02/22 134/86    Less than 140/90 Yes   Dilated retinal exam Most Recent Eye Exam Date: Not Found Annually Yes   Foot exam   Most Recent Foot Exam Date: Not Found Annually Yes       Review of Systems   Constitutional: Negative for " "fatigue.   Cardiovascular: Negative for leg swelling.   Gastrointestinal: Negative for constipation, diarrhea and nausea.   Endocrine: Positive for polyuria. Negative for polydipsia and polyphagia.   Skin: Negative for wound.        Past Medical History:   Diagnosis Date    Asthma     as a child    Obesity     Tobacco use       Social History     Tobacco Use    Smoking status: Current Every Day Smoker     Packs/day: 1.00    Smokeless tobacco: Never Used   Substance Use Topics    Alcohol use: No    Drug use: No     Family History   Problem Relation Age of Onset    Heart attack Brother       Past Surgical History:   Procedure Laterality Date    ROTATOR CUFF REPAIR Right           Current Outpatient Medications:     metFORMIN (GLUCOPHAGE-XR) 500 MG ER 24hr tablet, Take 1,000 mg by mouth 2 (two) times daily., Disp: , Rfl:     omeprazole (PRILOSEC) 40 MG capsule, Take 40 mg by mouth once daily., Disp: , Rfl:     rosuvastatin (CRESTOR) 20 MG tablet, Take 20 mg by mouth once daily., Disp: , Rfl:      Review of patient's allergies indicates:  No Known Allergies     Objective:   /86   Pulse 73   Ht 5' 9" (1.753 m)   Wt 94.2 kg (207 lb 10.8 oz)   SpO2 98%   BMI 30.67 kg/m²   BP Readings from Last 3 Encounters:   03/02/22 134/86   10/01/19 138/86   05/06/18 109/62     Wt Readings from Last 3 Encounters:   03/02/22 0922 94.2 kg (207 lb 10.8 oz)   10/01/19 1549 95.9 kg (211 lb 8.5 oz)   05/06/18 0204 93.3 kg (205 lb 11 oz)   05/05/18 2214 90.7 kg (200 lb)          Physical Exam  Vitals reviewed.   Constitutional:       General: He is not in acute distress.     Appearance: Normal appearance. He is well-developed. He is not ill-appearing, toxic-appearing or diaphoretic.   HENT:      Head: Normocephalic and atraumatic.   Eyes:      General: No scleral icterus.  Neck:      Trachea: No tracheal deviation.   Cardiovascular:      Rate and Rhythm: Normal rate and regular rhythm.      Heart sounds: No murmur " heard.  Pulmonary:      Effort: Pulmonary effort is normal. No respiratory distress.   Skin:     Comments: Foot exam: unable to palpate DP pulses bilat, +2 PT pulses bilat, no lesions or ulcers, nl microfilament bilat, nl toe nails bilat, toe nails over gone.      Neurological:      Mental Status: He is alert and oriented to person, place, and time.   Psychiatric:         Thought Content: Thought content normal.           Lab Results   Component Value Date     03/02/2022    K 4.6 03/02/2022     03/02/2022    CO2 28 03/02/2022    BUN 14 03/02/2022    CREATININE 1.0 03/02/2022     (H) 03/02/2022    HGBA1C 10.2 (H) 03/02/2022    MG 2.3 05/06/2018    AST 30 03/02/2022    ALT 34 03/02/2022    ALBUMIN 4.3 03/02/2022    PROT 7.0 03/02/2022    BILITOT 0.7 03/02/2022    WBC 7.73 03/02/2022    HGB 15.2 03/02/2022    HCT 45.6 03/02/2022    MCV 86 03/02/2022    MCH 28.6 03/02/2022     03/02/2022    MPV 9.2 03/02/2022    GRAN 4.4 03/02/2022    GRAN 56.7 03/02/2022    LYMPH 2.3 03/02/2022    LYMPH 29.6 03/02/2022    CHOL 158 03/02/2022    HDL 32 (L) 03/02/2022    LDLCALC 97.0 03/02/2022    TRIG 145 03/02/2022       Lab Results   Component Value Date    TSH 2.359 10/08/2019        Thyroid Labs Latest Ref Rng & Units 5/6/2018 10/8/2019 3/2/2022   TSH 0.400 - 4.000 uIU/mL - 2.359 -   Sodium 136 - 145 mmol/L 141 140 141   Potassium 3.5 - 5.1 mmol/L 4.0 5.0 4.6   Chloride 95 - 110 mmol/L 109 103 102   Carbon Dioxide 23 - 29 mmol/L 23 29 28   Glucose 70 - 110 mg/dL 120(H) 119(H) 233(H)   Blood Urea Nitrogen 6 - 20 mg/dL 17 18 14   Creatinine 0.5 - 1.4 mg/dL 1.0 1.1 1.0   Calcium 8.7 - 10.5 mg/dL 9.0 9.7 10.1   Total Protein 6.0 - 8.4 g/dL 6.3 7.4 7.0   Albumin 3.5 - 5.2 g/dL 3.8 4.4 4.3   Total Bilirubin 0.1 - 1.0 mg/dL 0.3 0.3 0.7   AST 10 - 40 U/L 48(H) 18 30   ALT 10 - 44 U/L 29 29 34   Anion Gap 8 - 16 mmol/L 9 8 11   eGFR (African American) >60 mL/min/1.73 m:2 >60 >60.0 >60.0   eGFR (Non-African American)  >60 mL/min/1.73 m:2 >60 >60.0 >60.0   WBC 3.90 - 12.70 K/uL 9.81 10.00 7.73   RBC 4.60 - 6.20 M/uL 4.85 5.53 5.31   Hemoglobin 14.0 - 18.0 g/dL 14.3 16.5 15.2   Hematocrit 40.0 - 54.0 % 42.6 49.9 45.6   MCV 82 - 98 fL 88 90 86   MCH 27.0 - 31.0 pg 29.5 29.8 28.6   MCHC 32.0 - 36.0 g/dL 33.6 33.1 33.3   RDW 11.5 - 14.5 % 12.3 12.2 11.7   Platelets 150 - 450 K/uL 273 304 283   MPV 9.2 - 12.9 fL 9.2 9.3 9.2   Gran # 1.8 - 7.7 K/uL 5.9 6.0 4.4   Lymph # 1.0 - 4.8 K/uL 3.1 3.0 2.3   Mono # 0.3 - 1.0 K/uL 0.5 0.6 0.5   Eos # 0.0 - 0.5 K/uL 0.3 0.4 0.5   Baso # 0.00 - 0.20 K/uL 0.03 0.05 0.06   Gran % 38.0 - 73.0 % 60.2 59.7 56.7   Lymph % 18.0 - 48.0 % 31.3 30.0 29.6   Mono% 4.0 - 15.0 % 4.6 5.7 6.6   Eos % 0.0 - 8.0 % 3.5 3.6 6.0   Baso % 0.0 - 1.9 % 0.3 0.5 0.8           Hemoglobin A1C   Date Value Ref Range Status   03/02/2022 10.2 (H) 4.0 - 5.6 % Final     Comment:     ADA Screening Guidelines:  5.7-6.4%  Consistent with prediabetes  >or=6.5%  Consistent with diabetes    High levels of fetal hemoglobin interfere with the HbA1C  assay. Heterozygous hemoglobin variants (HbS, HgC, etc)do  not significantly interfere with this assay.   However, presence of multiple variants may affect accuracy.     10/08/2019 6.9 (H) 4.0 - 5.6 % Final     Comment:     ADA Screening Guidelines:  5.7-6.4%  Consistent with prediabetes  >or=6.5%  Consistent with diabetes  High levels of fetal hemoglobin interfere with the HbA1C  assay. Heterozygous hemoglobin variants (HbS, HgC, etc)do  not significantly interfere with this assay.   However, presence of multiple variants may affect accuracy.           Assessment and plan:     Problem List Items Addressed This Visit        Cardiac/Vascular    Mixed hyperlipidemia     On statin. F/u repeat levels. Adjust dose prn.           Relevant Orders    Lipid Panel (Completed)       Endocrine    Type 2 diabetes mellitus with hyperglycemia, with long-term current use of insulin - Primary     Recheck  HbA1C.  Continue metformin. Add additional meds if A1C above goal.  Alternate checking BG before meals and bedtime.   Check microalbumin.  Discussed proper foot care.  Counseled pt on low carb/low fat diet and to increase physical activity.  On statin. Recheck lipids.             Relevant Medications    metFORMIN (GLUCOPHAGE-XR) 500 MG ER 24hr tablet    Other Relevant Orders    CBC Auto Differential (Completed)    Hemoglobin A1C (Completed)    Microalbumin/Creatinine Ratio, Urine (Completed)    Lipid Panel (Completed)    Comprehensive Metabolic Panel (Completed)    Class 1 obesity due to excess calories with serious comorbidity and body mass index (BMI) of 30.0 to 30.9 in adult     BMI 30. Encouraged healthy low fat low carb diet and increase physical activity.                  Labs now    RTC in 3 months with any diabetes NP            Disclaimer: This note has been generated using voice-recognition software. There may be typographical errors that have been missed during proof-reading.

## 2022-03-03 ENCOUNTER — TELEPHONE (OUTPATIENT)
Dept: ENDOCRINOLOGY | Facility: CLINIC | Age: 52
End: 2022-03-03
Payer: COMMERCIAL

## 2022-03-03 DIAGNOSIS — Z79.4 TYPE 2 DIABETES MELLITUS WITH HYPERGLYCEMIA, WITH LONG-TERM CURRENT USE OF INSULIN: Primary | ICD-10-CM

## 2022-03-03 DIAGNOSIS — E11.65 TYPE 2 DIABETES MELLITUS WITH HYPERGLYCEMIA, WITH LONG-TERM CURRENT USE OF INSULIN: Primary | ICD-10-CM

## 2022-03-03 PROBLEM — E78.2 MIXED HYPERLIPIDEMIA: Status: ACTIVE | Noted: 2022-03-03

## 2022-03-03 PROBLEM — E66.09 CLASS 1 OBESITY DUE TO EXCESS CALORIES WITH SERIOUS COMORBIDITY AND BODY MASS INDEX (BMI) OF 30.0 TO 30.9 IN ADULT: Status: ACTIVE | Noted: 2022-03-03

## 2022-03-03 PROBLEM — E66.811 CLASS 1 OBESITY DUE TO EXCESS CALORIES WITH SERIOUS COMORBIDITY AND BODY MASS INDEX (BMI) OF 30.0 TO 30.9 IN ADULT: Status: ACTIVE | Noted: 2022-03-03

## 2022-03-03 RX ORDER — EMPAGLIFLOZIN 10 MG/1
10 TABLET, FILM COATED ORAL DAILY
Qty: 90 TABLET | Refills: 3 | Status: SHIPPED | OUTPATIENT
Start: 2022-03-03 | End: 2022-03-08 | Stop reason: SDUPTHER

## 2022-03-03 RX ORDER — METFORMIN HYDROCHLORIDE 500 MG/1
1000 TABLET, EXTENDED RELEASE ORAL 2 TIMES DAILY
Qty: 180 TABLET | Refills: 1 | Status: SHIPPED | OUTPATIENT
Start: 2022-03-03 | End: 2022-03-08 | Stop reason: SDUPTHER

## 2022-03-03 RX ORDER — SEMAGLUTIDE 1.34 MG/ML
0.25 INJECTION, SOLUTION SUBCUTANEOUS
Qty: 4 PEN | Refills: 4 | Status: SHIPPED | OUTPATIENT
Start: 2022-03-03 | End: 2022-03-08 | Stop reason: SDUPTHER

## 2022-03-03 RX ORDER — ROSUVASTATIN CALCIUM 20 MG/1
20 TABLET, COATED ORAL DAILY
Qty: 90 TABLET | Refills: 0 | Status: SHIPPED | OUTPATIENT
Start: 2022-03-03 | End: 2022-03-08 | Stop reason: SDUPTHER

## 2022-03-03 NOTE — TELEPHONE ENCOUNTER
Needs a new primary care to refill prilosec and crestor (will do 90 day supply of crestor with no refills to give him some time to establish care).     Will do other scripts for jardiance, ozempic and metformin.

## 2022-03-03 NOTE — TELEPHONE ENCOUNTER
Pt verbalized understanding   Open to ozempic and jardiance  appt made for DM Educ next week    Please Rx Ozempic and Jardiance and pt asking to please refill all his other meds as he is almost out (metformin, prilosec, crestor)

## 2022-03-03 NOTE — TELEPHONE ENCOUNTER
Reviewed labs. A1C is significantly elevated at 10.2%.Verify he is only on metformin. What other diabetes meds has he tried in the past?    Any hx of pancreatitis? Ever tried GLP-1 agonist or SGLT2i?    If no contraindication, would like to start Ozempic once weekly. Should expect some nausea when first starting. If severe or has severe abd pain/pancreatiitis should notify us. Start Jardiance 10 mg daily. Should expect increased urination. Monitor for signs of UTI or yeast infection. Notify us if this occurs. Should keep hydrated.    Cholesterol ok continue statin.     Urine micro wnls.    Liver and kidney labs wnls    My plan would be to:  Continue metformin. Add Ozempic .025 mg weekly and Jardiance 10 mg daily. Check blood sugar once daily and alternate time of day. Would like to refer to diabetic education. (I will order meds if pt ok with this plan).     Should RTC with diabetes NP as scheduled.

## 2022-03-03 NOTE — ASSESSMENT & PLAN NOTE
Recheck HbA1C.  Continue metformin. Add additional meds if A1C above goal.  Alternate checking BG before meals and bedtime.   Check microalbumin.  Discussed proper foot care.  Counseled pt on low carb/low fat diet and to increase physical activity.  On statin. Recheck lipids.

## 2022-03-08 ENCOUNTER — CLINICAL SUPPORT (OUTPATIENT)
Dept: DIABETES | Facility: CLINIC | Age: 52
End: 2022-03-08
Payer: COMMERCIAL

## 2022-03-08 DIAGNOSIS — Z79.4 TYPE 2 DIABETES MELLITUS WITH HYPERGLYCEMIA, WITH LONG-TERM CURRENT USE OF INSULIN: ICD-10-CM

## 2022-03-08 DIAGNOSIS — E11.65 TYPE 2 DIABETES MELLITUS WITH HYPERGLYCEMIA, WITH LONG-TERM CURRENT USE OF INSULIN: ICD-10-CM

## 2022-03-08 PROCEDURE — 99999 PR PBB SHADOW E&M-EST. PATIENT-LVL II: ICD-10-PCS | Mod: PBBFAC,,, | Performed by: DIETITIAN, REGISTERED

## 2022-03-08 PROCEDURE — G0108 DIAB MANAGE TRN  PER INDIV: HCPCS | Mod: S$GLB,,, | Performed by: DIETITIAN, REGISTERED

## 2022-03-08 PROCEDURE — G0108 PR DIAB MANAGE TRN  PER INDIV: ICD-10-PCS | Mod: S$GLB,,, | Performed by: DIETITIAN, REGISTERED

## 2022-03-08 PROCEDURE — 99999 PR PBB SHADOW E&M-EST. PATIENT-LVL II: CPT | Mod: PBBFAC,,, | Performed by: DIETITIAN, REGISTERED

## 2022-03-08 RX ORDER — METFORMIN HYDROCHLORIDE 500 MG/1
1000 TABLET, EXTENDED RELEASE ORAL 2 TIMES DAILY
Qty: 180 TABLET | Refills: 1 | Status: SHIPPED | OUTPATIENT
Start: 2022-03-08 | End: 2022-06-07 | Stop reason: SDUPTHER

## 2022-03-08 RX ORDER — EMPAGLIFLOZIN 10 MG/1
10 TABLET, FILM COATED ORAL DAILY
Qty: 90 TABLET | Refills: 3 | Status: SHIPPED | OUTPATIENT
Start: 2022-03-08 | End: 2022-11-11 | Stop reason: SDUPTHER

## 2022-03-08 RX ORDER — ROSUVASTATIN CALCIUM 20 MG/1
20 TABLET, COATED ORAL DAILY
Qty: 90 TABLET | Refills: 0 | Status: SHIPPED | OUTPATIENT
Start: 2022-03-08 | End: 2022-06-21 | Stop reason: SDUPTHER

## 2022-03-08 RX ORDER — SEMAGLUTIDE 1.34 MG/ML
0.25 INJECTION, SOLUTION SUBCUTANEOUS
Qty: 4 PEN | Refills: 4 | Status: SHIPPED | OUTPATIENT
Start: 2022-03-08 | End: 2022-11-11 | Stop reason: DRUGHIGH

## 2022-03-08 NOTE — Clinical Note
Dr. Koo--Patient will be establishing care with you on Friday 3/11/22.  Please verify patient has initiated the diabetes meds that were prescribed by Endocrinology (Jardiance and Ozempic). Thank you.

## 2022-03-09 ENCOUNTER — PATIENT MESSAGE (OUTPATIENT)
Dept: DIABETES | Facility: CLINIC | Age: 52
End: 2022-03-09
Payer: COMMERCIAL

## 2022-03-09 VITALS — HEIGHT: 69 IN | WEIGHT: 206 LBS | BODY MASS INDEX: 30.51 KG/M2

## 2022-03-09 NOTE — PROGRESS NOTES
Diabetes Care Specialist Progress Note  Author: Julia Garner RD, CDE  Date: 3/9/2022    Program Intake  Reason for Diabetes Program Visit:: Initial Diabetes Assessment  Current diabetes risk level:: high  In the last 12 months, have you:: none    Lab Results   Component Value Date    HGBA1C 10.2 (H) 03/02/2022     Clinical    Patient Health Rating  Compared to other people your age, how would you rate your health?: Good    Problem Review  Reviewed Problem List with Patient: yes  Active comorbidities affecting diabetes self-care.: no  Reviewed health maintenance: yes    Clinical Assessment  Current Diabetes Treatment: Oral Medication, Injectable (Patient currently taking metformin 500 mg (2 tabs twice daily with meals).  Was prescribed Jardiance 10 mg once daily and Ozempic 0.25 mg weekly at 3/2/22 endocrine visit but his [facility]) stating both meds on backorder and he has not started.)  Have you ever experienced hypoglycemia (low blood sugar)?: no  Have you ever experienced hyperglycemia (high blood sugar)?: no    Medication Information  How do you obtain your medications?: Patient drives  How many days a week do you miss your medications?: Never (Does report about to run out of all meds (metformin, omeprazole, and rosuvastatin)--needing to establish with PCP for care (previously seen on [facility]), assisted getting patient scheduled for PCP on 3/11/22)  Do you use a pill box or medication chart to help you manage your medications?: No  Do you sometimes have difficulty refilling your medications?: Yes (see comment) (Has tried for 1 week to get new DM meds prescribed at 3/2/22 endocrine visit and pharmacy unable to get meds--Rx for these meds transferred today to [facility] and filled after DM education visit)  Medication adherence impacting ability to self-manage diabetes?: Yes    Labs  Do you have regular lab work to monitor your medications?: No (Now re-establishing care as he had not gone to  a primary care in 2-3 years.)  Lab Compliance Barriers: No    Nutritional Status  Diet: Regular (Patient is a --has been eating sugar free or keto recently.  Does dine out at lunch most days due to work.)  Meal Plan 24 Hour Recall: Breakfast, Lunch, Dinner, Snack (Tries to eat 3 meals daily, snacks mid morning and at night)  Meal Plan 24 Hour Recall - Breakfast: sugar free waffle, sugar free syrup OR dodson, eggs, sausage, cheese with 1-2 slices Juan's bread. Coffee with splenda and sugar free creamer each morning  Meal Plan 24 Hour Recall - Lunch: Usually out/fast food: [employer] salad or burger no bun.  Drinks water or Coke Zero.  Meal Plan 24 Hour Recall - Dinner: Green  (keto meal 3-4 times a week).  Drinks water or Coke Zero.  Meal Plan 24 Hour Recall - Snack: banana or grapes. At night, blends PB powder in 12 fl oz milk with ice  Change in appetite?: No  Dentation:: Intact  Recent Changes in Weight: No Recent Weight Change  Current nutritional status an area of need that is impacting patient's ability to self-manage diabetes?: No    Additional Social History    Support  Does anyone support you with your diabetes care?: yes  Who supports you?: self, spouse  Who takes you to your medical appointments?: self  Does the current support meet the patient's needs?: Yes  Is Support an area impacting ability to self-manage diabetes?: No    Access to Mass Media & Technology  Does the patient have access to any of the following devices or technologies?: Smart phone, Internet Access  Media or technology needs impacting ability to self-manage diabetes?: No    Cognitive/Behavioral Health  Alert and Oriented: Yes  Difficulty Thinking: No  Requires Prompting: No  Requires assistance for routine expression?: No  Cognitive or behavioral barriers impacting ability to self-manage diabetes?: No    Culture/Jainism  Culture or Methodist beliefs that may impact ability to access healthcare: No    Communication  Language  preference: English  Hearing Problems: No  Vision Problems: No  Communication needs impacting ability to self-manage diabetes?: No    Health Literacy  Preferred Learning Method: Face to Face, Web Based  How often do you need to have someone help you read instructions, pamphlets, or written material from your doctor or pharmacy?: Never  Health literacy needs impacting ability to self-manage diabetes?: No    Diabetes Self-Management Skills Assessment    Diabetes Disease Process/Treatment Options  Patient/caregiver able to state what happens when someone has diabetes.: yes  Patient/caregiver knows what type of diabetes they have.: yes  Diabetes Type : Type II (Reports he was diagnosed several years ago by PCP)  Patient/caregiver able to identify at least three signs and symptoms of diabetes.: yes  Identified signs and symptoms:: frequent urination, increased thirst (Patient does report increased urination causing him to establish with endocrinology)  Patient able to identify at least three risk factors for diabetes.: yes  Identified risk factors:: family history, age over 40  Diabetes Disease Process/Treatment Options: Skills Assessment Completed: Yes  Assessment indicates:: Adequate understanding  Area of need?: No    Nutrition/Healthy Eating  Challenges to healthy eating:: eating out, going to parties, snacking between meals and at night  Method of carbohydrate measurement:: no method  Patient can identify foods that impact blood sugar.: yes  Patient-identified foods:: starches (bread, pasta, rice, cereal), soda, sweets  Nutrition/Healthy Eating Skills Assessment Completed:: Yes  Assessment indicates:: Instruction Needed  Area of need?: Yes    Physical Activity/Exercise  Patient's daily activity level:: moderately active  Patient formally exercises outside of work.: no  Reasons for not exercising:: work schedule (Patient did order a bicycle for home use and will begin using once he received)  Patient can identify  forms of physical activity.: yes  Stated forms of physical activity:: yardwork, manual activity at work  Patient can identify reasons why exercise/physical activity is important in diabetes management.: yes  Identified reasons:: lowers blood glucose, blood pressure, and cholesterol  Physical Activity/Exercise Skills Assessment Completed: : Yes  Assessment indicates:: Adequate understanding  Area of need?: No    Medications  Patient is able to describe current diabetes management routine.: yes  Diabetes management routine:: diet, oral medications, injectable medications (Has not initiated Jardiance or Ozempic that was prescribed by endocrine on 3/2/22 due to issues with filling Rx at [facility]--transferred Rx to [facility] today for patient and Rx filled.)  Patient is able to identify current diabetes medications, dosages, and appropriate timing of medications.: yes  Patient understands the purpose of the medications taken for diabetes.: no (Explained how Jardiance, Ozempic, and metformin work to decrease glucose levels.)  Patient reports problems or concerns with current medication regimen.: yes  Medication regimen problems/concerns:: other (see comments) (Unable to get Rx filled at preferred pharmacy --assisted patient today in getting Rxs transferred to [facility] and filled after DM education visit.)  Medication Skills Assessment Completed:: Yes  Assessment indicates:: Instruction Needed  Area of need?: Yes    Home Blood Glucose Monitoring  Patient states that blood sugar is checked at home daily.: no  Reasons for not monitoring:: other (see comments) (Time, busy work schedule.  Does have a glucometer and supplies at home and knows how to use it.)  Home Blood Glucose Monitoring Skills Assessment Completed: : Yes  Assessment indicates:: Instruction Needed (Patient states a friend gave him a Freestyle Ricardo CGMS--discussed needing regular glucose levels to better determine DM medication needs)  Area of  need?: Yes    Acute Complications  Acute Complications Skills Assessment Completed: : No  Deferred due to:: Time    Chronic Complications  Patient can identify major chronic complications of diabetes.: yes  Stated chronic complications:: heart disease/heart attack, kidney disease  Patient can identify ways to prevent or delay diabetes complications.: yes  Stated ways to prevent complications:: healthy eating and regular activity, controlling blood sugar  Patient is aware that having diabetes increases risk of heart disease?: Yes  Patient is aware that heart disease is the leading cause of death and disability in people with diabetes?: Yes  Patient able to state risk factors for heart disease?: Yes  Patient stated risk factors for heart disease:: High cholesterol  Patient is taking statin?: Yes (Discussed importance of not discontinuing statin without speaking to PCP--Endocrine provider refilled Rx for statin as patient running out of meds until he can establish with PCP)  Chronic Complications Skills Assessment Completed: : Yes  Assessment indicates:: Adequate understanding  Area of need?: No    Psychosocial/Coping  Patient can identify ways of coping with chronic disease.: yes  Patient-stated ways of coping with chronic disease:: support from loved ones  Psychosocial/Coping Skills Assessment Completed: : Yes  Assessment indicates:: Adequate understanding  Area of need?: No    Assessment Summary and Plan  Based on today's diabetes care assessment, the following areas of need were identified:      Social 3/8/2022   Support No   Access to Mass Media/Tech No   Cognitive/Behavioral Health No   Culture/[employer]ist No   Communication No   Health Literacy No        Clinical 3/8/2022   Medication Adherence Yes--unable to get Jardiance and Ozempic filled at his pharmacy, assistance today in getting those Rx filled to initiate due to elevated Hgb A1c.       Lab Compliance No   Nutritional Status No        Diabetes  Self-Management Skills 3/8/2022   Diabetes Disease Process/Treatment Options No   Nutrition/Healthy Eating Yes--see care plan.  Will work on balancing meals and snacks    Physical Activity/Exercise No--patient ordered a home bicycle and will begin use.  Is active at job as  and doing yard work   Medication Yes--see care plan.  Taking metformin and will be initiating Jardiance and Ozempic    Home Blood Glucose Monitoring Yes--see care plan.     Chronic Complications No   Psychosocial/Coping No      Today's interventions were provided through individual discussion, instruction, and written materials were provided.      Patient verbalized understanding of instruction and written materials.  Pt was able to return back demonstration of instructions today. Patient understood key points, needs reinforcement and further instruction.     Diabetes Self-Management Care Plan:    Today's Diabetes Self-Management Care Plan was developed with Roni's input. Roni has agreed to work toward the following goal(s) to improve his/her overall diabetes control.      Care Plan: Diabetes Management   Updates made since 2/7/2022 12:00 AM      Problem: Medications       Goal: Patient Agrees to take Diabetes Medication(s) as prescribed: metformin 500 mg (2 tablets twice daily), initiating Ozempic 0.25 mg injection weekly and also Jardiance 10 mg once daily.    Start Date: 3/8/2022   Expected End Date: 6/8/2022   Priority: Medium   Barriers: Lack of Supplies   Note:    Patient was prescribed Ozempic and Jardiance after seeing Endocrinology on 3/2/22 due to elevated Hgb A1c of 10.2%--patient states his [facility] was unable to get both these meds in stock and fill Rx so has not initiated.  Rx for Jardiance and Ozempic transferred to [facility] while at DM education visit today and patient able to fill both with plans to start both these meds.       Task: Reviewed with patient all current diabetes medications and provided basic  review of the purpose, dosage, frequency, side effects, and storage of both oral and injectable diabetes medications. Completed 3/9/2022      Task: Reviewed possible resources for acquiring cost prohibitive medication. Completed 3/9/2022      Task: Discussed guidelines for preventing, detecting and treating hypoglycemia and hyperglycemia and reviewed the importance of meal and medication timing with diabetes mediations for prevention of hypoglycemia and maximum drug benefit. Completed 3/9/2022      Problem: Blood Glucose Self-Monitoring       Goal: Patient agrees to check and record blood sugars 2 times per day (fasting or BEFORE meals) until diabetes control improves.  Patient possibly getting Freestyle Rosetta CGMS from friend. [employer] message sent to patient to obtain glucose readings in 2 weeks for review and after initiating new DM medications prescribed by Endocrinology.    Start Date: 3/8/2022   Expected End Date: 6/8/2022   Priority: High   Barriers: No Barriers Identified   Note:    Patient has a working glucometer and supplies at home.  Not regularly monitoring glucose levels. No logs brought to clinic for review today.       Task: Reviewed the importance of self-monitoring blood glucose and keeping logs. Completed 3/9/2022      Task: Provided patient with blood glucose logs, reviewed appropriate timing and frequency to SMBG, education on parameters on when to notify provider and advised patient to bring logs to all appts with PCP/Endocrinologist/Diabetes Care Specialist. Completed 3/9/2022      Problem: Healthy Eating       Goal: Eat 3 meals daily with 45-60g/3-4 servings of Carbohydrate per meal. Balance all meals and snacks with lean protein.  Add non-starchy vegetables at lunch and dinner.    Start Date: 3/8/2022   Expected End Date: 6/8/2022   Priority: Low   Barriers: No Barriers Identified   Note:    Reviewed basic food groups with patient (carbohydrate, protein, and fat).   Carbohydrate sources  "reviewed in detail.  Typical food intake obtained from patient.  Practiced reading food labels with patient focusing on serving size and total carbohydrate intake (not sugar intake). Discussed telephone apps ([employer], Attero) that patient can download to smart phone to better determine carbohydrate content of foods when dining out.  Practiced meal planning with foods typically eaten to promote balanced eating.  Discussed portion sizes.  Patient encouraged to measure food portions or can limit carbohydrate portions at meals to a "fistful".  Discussed having lean protein at all meals and to also add non starchy vegetables at lunch and dinner.  Written education information provided to patient for use at home.        Task: Reviewed the sources and role of Carbohydrate, Protein, and Fat and how each nutrient impacts blood sugar. Completed 3/9/2022      Task: Provided visual examples using dry measuring cups, food models, and other familiar objects such as computer mouse, deck or cards, tennis ball etc. to help with visualization of portions. Completed 3/9/2022      Task: Discussed strategies for choosing healthier menu options when dining out. Completed 3/9/2022      Task: Review the importance of balancing carbohydrates with each meal using portion control techniques to count servings of carbohydrate and label reading to identify serving size and amount of total carbs per serving. Completed 3/9/2022      Task: Provided Sample plate method and reviewed the use of the plate to estimate amounts of carbohydrate per meal. Completed 3/9/2022        Follow Up Plan     Follow up in about 3 months (around 6/8/2022) for Continued DSMES--has Endocrine follow up on 6/2/22. Patient to establish with PCP--appt set for 3/11/22 with Dr. Koo.  Patient compliant with metformin as prescribed--has not yet initiated Jardiance and Ozempic that was prescribed by endocrine last week due to pharmacy issues.  Able to transfer Rx " for Jardiance and Ozempic today to [facility] and patient filled Rxs and will initiate these meds.  Not regularly monitoring glucose--difficult to determine needs.  Will send [employer] message in 2 weeks to obtain glucose readings --patient asked to check glucose 2 times daily (fasting and BEFORE meals) or he may start using Freestyle Ricardo continuous glucose monitor that was given to him by a friend.  Discussed which foods affect glucose levels today and how to balance meals and snacks.  Practiced meal planning with foods typically eaten.      Today's care plan and follow up schedule was discussed with patient.  Roni verbalized understanding of the care plan, goals, and agrees to follow up plan.        The patient was encouraged to communicate with his/her health care provider/physician and care team regarding his/her condition(s) and treatment.  I provided the patient with my contact information today and encouraged to contact me via phone or [facility] as needed.     Length of Visit   Total Time: 60 Minutes

## 2022-03-11 ENCOUNTER — OFFICE VISIT (OUTPATIENT)
Dept: FAMILY MEDICINE | Facility: CLINIC | Age: 52
End: 2022-03-11
Payer: COMMERCIAL

## 2022-03-11 VITALS
WEIGHT: 201.06 LBS | BODY MASS INDEX: 29.78 KG/M2 | HEIGHT: 69 IN | SYSTOLIC BLOOD PRESSURE: 130 MMHG | DIASTOLIC BLOOD PRESSURE: 88 MMHG | HEART RATE: 80 BPM | OXYGEN SATURATION: 96 %

## 2022-03-11 DIAGNOSIS — D22.9 ATYPICAL NEVUS: ICD-10-CM

## 2022-03-11 DIAGNOSIS — Z79.4 TYPE 2 DIABETES MELLITUS WITH HYPERGLYCEMIA, WITH LONG-TERM CURRENT USE OF INSULIN: Primary | ICD-10-CM

## 2022-03-11 DIAGNOSIS — K21.9 GERD WITHOUT ESOPHAGITIS: ICD-10-CM

## 2022-03-11 DIAGNOSIS — E78.2 MIXED HYPERLIPIDEMIA: ICD-10-CM

## 2022-03-11 DIAGNOSIS — E11.65 TYPE 2 DIABETES MELLITUS WITH HYPERGLYCEMIA, WITH LONG-TERM CURRENT USE OF INSULIN: Primary | ICD-10-CM

## 2022-03-11 PROCEDURE — 3046F PR MOST RECENT HEMOGLOBIN A1C LEVEL > 9.0%: ICD-10-PCS | Mod: CPTII,S$GLB,, | Performed by: FAMILY MEDICINE

## 2022-03-11 PROCEDURE — 99215 OFFICE O/P EST HI 40 MIN: CPT | Mod: S$GLB,,, | Performed by: FAMILY MEDICINE

## 2022-03-11 PROCEDURE — 1159F MED LIST DOCD IN RCRD: CPT | Mod: CPTII,S$GLB,, | Performed by: FAMILY MEDICINE

## 2022-03-11 PROCEDURE — 1160F RVW MEDS BY RX/DR IN RCRD: CPT | Mod: CPTII,S$GLB,, | Performed by: FAMILY MEDICINE

## 2022-03-11 PROCEDURE — 3008F PR BODY MASS INDEX (BMI) DOCUMENTED: ICD-10-PCS | Mod: CPTII,S$GLB,, | Performed by: FAMILY MEDICINE

## 2022-03-11 PROCEDURE — 3008F BODY MASS INDEX DOCD: CPT | Mod: CPTII,S$GLB,, | Performed by: FAMILY MEDICINE

## 2022-03-11 PROCEDURE — 3075F SYST BP GE 130 - 139MM HG: CPT | Mod: CPTII,S$GLB,, | Performed by: FAMILY MEDICINE

## 2022-03-11 PROCEDURE — 1160F PR REVIEW ALL MEDS BY PRESCRIBER/CLIN PHARMACIST DOCUMENTED: ICD-10-PCS | Mod: CPTII,S$GLB,, | Performed by: FAMILY MEDICINE

## 2022-03-11 PROCEDURE — 3046F HEMOGLOBIN A1C LEVEL >9.0%: CPT | Mod: CPTII,S$GLB,, | Performed by: FAMILY MEDICINE

## 2022-03-11 PROCEDURE — 3079F PR MOST RECENT DIASTOLIC BLOOD PRESSURE 80-89 MM HG: ICD-10-PCS | Mod: CPTII,S$GLB,, | Performed by: FAMILY MEDICINE

## 2022-03-11 PROCEDURE — 3079F DIAST BP 80-89 MM HG: CPT | Mod: CPTII,S$GLB,, | Performed by: FAMILY MEDICINE

## 2022-03-11 PROCEDURE — 3066F PR DOCUMENTATION OF TREATMENT FOR NEPHROPATHY: ICD-10-PCS | Mod: CPTII,S$GLB,, | Performed by: FAMILY MEDICINE

## 2022-03-11 PROCEDURE — 3075F PR MOST RECENT SYSTOLIC BLOOD PRESS GE 130-139MM HG: ICD-10-PCS | Mod: CPTII,S$GLB,, | Performed by: FAMILY MEDICINE

## 2022-03-11 PROCEDURE — 99215 PR OFFICE/OUTPT VISIT, EST, LEVL V, 40-54 MIN: ICD-10-PCS | Mod: S$GLB,,, | Performed by: FAMILY MEDICINE

## 2022-03-11 PROCEDURE — 3066F NEPHROPATHY DOC TX: CPT | Mod: CPTII,S$GLB,, | Performed by: FAMILY MEDICINE

## 2022-03-11 PROCEDURE — 99999 PR PBB SHADOW E&M-EST. PATIENT-LVL IV: CPT | Mod: PBBFAC,,, | Performed by: FAMILY MEDICINE

## 2022-03-11 PROCEDURE — 3061F PR NEG MICROALBUMINURIA RESULT DOCUMENTED/REVIEW: ICD-10-PCS | Mod: CPTII,S$GLB,, | Performed by: FAMILY MEDICINE

## 2022-03-11 PROCEDURE — 1159F PR MEDICATION LIST DOCUMENTED IN MEDICAL RECORD: ICD-10-PCS | Mod: CPTII,S$GLB,, | Performed by: FAMILY MEDICINE

## 2022-03-11 PROCEDURE — 3061F NEG MICROALBUMINURIA REV: CPT | Mod: CPTII,S$GLB,, | Performed by: FAMILY MEDICINE

## 2022-03-11 PROCEDURE — 99999 PR PBB SHADOW E&M-EST. PATIENT-LVL IV: ICD-10-PCS | Mod: PBBFAC,,, | Performed by: FAMILY MEDICINE

## 2022-03-11 RX ORDER — OMEPRAZOLE 40 MG/1
40 CAPSULE, DELAYED RELEASE ORAL DAILY
Qty: 90 CAPSULE | Refills: 3 | Status: SHIPPED | OUTPATIENT
Start: 2022-03-11 | End: 2023-03-27 | Stop reason: SDUPTHER

## 2022-03-11 NOTE — PROGRESS NOTES
HPI: Here to establish care. He has had DM 2 for a couple of years. His last A1c was over 10%. He is seeing Endo. Nexium 40 mg daily without dysphagia. Weight has been stable.          Review of Systems   Constitutional: Negative.    HENT: Negative.    Eyes: Negative.    Respiratory: Negative.    Cardiovascular: Negative.    Gastrointestinal: Negative.    Genitourinary: Negative.    Musculoskeletal: Negative.    Skin: Negative.    Neurological: Negative.    Psychiatric/Behavioral: Negative.         Physical Exam  Constitutional:       Appearance: Normal appearance.   HENT:      Head: Normocephalic and atraumatic.      Nose: Nose normal.      Mouth/Throat:      Mouth: Mucous membranes are dry.   Eyes:      Extraocular Movements: Extraocular movements intact.      Pupils: Pupils are equal, round, and reactive to light.   Cardiovascular:      Rate and Rhythm: Normal rate and regular rhythm.      Pulses: Normal pulses.      Heart sounds: Normal heart sounds.   Pulmonary:      Breath sounds: Normal breath sounds.   Abdominal:      General: Abdomen is flat. Bowel sounds are normal.      Palpations: Abdomen is soft.   Musculoskeletal:         General: Normal range of motion.      Cervical back: Normal range of motion and neck supple.   Skin:     General: Skin is warm and dry.   Neurological:      General: No focal deficit present.      Mental Status: He is alert and oriented to person, place, and time.   Psychiatric:         Mood and Affect: Mood normal.         Behavior: Behavior normal.         Thought Content: Thought content normal.         Judgment: Judgment normal.          Type 2 diabetes mellitus with hyperglycemia, with long-term current use of insulin  Continue with Endo.          GERD without esophagitis  -     omeprazole (PRILOSEC) 40 MG capsule; Take 1 capsule (40 mg total) by mouth once daily.  Dispense: 90 capsule; Refill: 3    Mixed hyperlipidemia  Treated, will monitor.  Roni was seen today for establish  care.    Diagnoses and all orders for this visit:    Type 2 diabetes mellitus with hyperglycemia, with long-term current use of insulin    GERD without esophagitis  -     omeprazole (PRILOSEC) 40 MG capsule; Take 1 capsule (40 mg total) by mouth once daily.    Mixed hyperlipidemia  Continue with medications.     Atypical nevus  -     Ambulatory referral/consult to Dermatology; Future

## 2022-03-18 ENCOUNTER — PATIENT MESSAGE (OUTPATIENT)
Dept: ADMINISTRATIVE | Facility: HOSPITAL | Age: 52
End: 2022-03-18
Payer: COMMERCIAL

## 2022-05-09 ENCOUNTER — PATIENT MESSAGE (OUTPATIENT)
Dept: SMOKING CESSATION | Facility: CLINIC | Age: 52
End: 2022-05-09
Payer: COMMERCIAL

## 2022-05-19 ENCOUNTER — TELEPHONE (OUTPATIENT)
Dept: DERMATOLOGY | Facility: CLINIC | Age: 52
End: 2022-05-19
Payer: COMMERCIAL

## 2022-05-19 NOTE — TELEPHONE ENCOUNTER
Per:  Tere Ballesteros    Patient may schedule with outside MD due to the first available to me is Sept. 2022

## 2022-06-07 DIAGNOSIS — Z79.4 TYPE 2 DIABETES MELLITUS WITH HYPERGLYCEMIA, WITH LONG-TERM CURRENT USE OF INSULIN: ICD-10-CM

## 2022-06-07 DIAGNOSIS — E11.65 TYPE 2 DIABETES MELLITUS WITH HYPERGLYCEMIA, WITH LONG-TERM CURRENT USE OF INSULIN: ICD-10-CM

## 2022-06-07 RX ORDER — METFORMIN HYDROCHLORIDE 500 MG/1
1000 TABLET, EXTENDED RELEASE ORAL 2 TIMES DAILY
Qty: 180 TABLET | Refills: 1 | OUTPATIENT
Start: 2022-06-07

## 2022-06-07 RX ORDER — METFORMIN HYDROCHLORIDE 500 MG/1
1000 TABLET, EXTENDED RELEASE ORAL 2 TIMES DAILY
Qty: 360 TABLET | Refills: 2 | Status: SHIPPED | OUTPATIENT
Start: 2022-06-07 | End: 2022-11-11 | Stop reason: SDUPTHER

## 2022-06-07 RX ORDER — ROSUVASTATIN CALCIUM 20 MG/1
20 TABLET, COATED ORAL DAILY
Qty: 90 TABLET | Refills: 0 | OUTPATIENT
Start: 2022-06-07

## 2022-06-07 NOTE — TELEPHONE ENCOUNTER
----- Message from Ena Rea sent at 6/7/2022  2:21 PM CDT -----  Regarding: refill  Contact: patient  Type:  RX Refill Request    Who Called:  patient  Refill or New Rx:  refill  RX Name and Strength:    metFORMIN (GLUCOPHAGE-XR) 500 MG ER 24hr tablet  rosuvastatin (CRESTOR) 20 MG tablet  How is the patient currently taking it? (ex. 1XDay):    Is this a 30 day or 90 day RX:  90  Preferred Pharmacy with phone number:   [facility]  [address] [facility] [address] [place] [address]  Phone: [phone] Fax: [phone]  Local or Mail Order:  local  Ordering Provider:  Dr Sandifer Best Call Back Number:  [phone] (home)   Additional Information:  Patient states he has two other medications waiting at the pharmacy and would like to  together.Patient is leaving town in the morning and needs to bring with him.  Please call patient to advise.Thanks!

## 2022-06-15 ENCOUNTER — PATIENT OUTREACH (OUTPATIENT)
Dept: ADMINISTRATIVE | Facility: HOSPITAL | Age: 52
End: 2022-06-15
Payer: COMMERCIAL

## 2022-06-15 ENCOUNTER — PATIENT MESSAGE (OUTPATIENT)
Dept: ADMINISTRATIVE | Facility: HOSPITAL | Age: 52
End: 2022-06-15
Payer: COMMERCIAL

## 2022-06-15 DIAGNOSIS — E11.9 TYPE 2 DIABETES MELLITUS WITHOUT COMPLICATION: ICD-10-CM

## 2022-06-15 NOTE — PROGRESS NOTES
Bulk order report.  see outreach via Portal msg/Letter:      A1C    Also due:  Health Maintenance Due   Topic Date Due    Hepatitis C Screening  Never done    COVID-19 Vaccine (1) Never done    Pneumococcal Vaccines (Age 0-64) (1 - PCV) Never done    Foot Exam  Never done    Eye Exam  Never done    HIV Screening  Never done    TETANUS VACCINE  Never done    Colorectal Cancer Screening  Never done    Hemoglobin A1c  06/02/2022            Make an appointment with Raquel Kamara MD in one week after CT has been done.  Flexible Fiber Endoscopy was performed today.  We will give you a call with the results of the sinus culture.  Start Flonase Nasal Spray, 2 sprays in each nostril daily.    Saline Sinus Irrigation Instructions    Needed:  · One gallon of distilled water  · 12 teaspoons of sea (kosher) salt  · 8 teaspoons of baking soda  · One small bulb syringe - infant type (available in the [employer] shop)    Directions:  · Mix all ingredients. (can be purchased in the grocery store)  · Lean over a sink and aim the bulb syringe back and up using approximately 2 ounces for each nostril.  · Irrigate twice daily unless instructed otherwise.   · Do not use iodized salt or tap water.  · If you have questions, please call us at [phone].    Today you were diagnosed with Acid Reflux.    Symptoms include:  Frequent need to clear throat  Feeling like you are choking  Chronic cough  Hoarseness  Sensation of having a \"lump\" in your throat  Recurrent Sore Throat    Treatment for Acid Reflux involves basic diet and lifestyle changes such as:  Avoid fatty foods and spicy foods  Limit alcohol, caffeine, and fizzy beverages- such as soda pop  Limit chocolate, mints  Avoid acidic and citrus type foods such as Grapefruit, Orange Juice, Oranges, Brielle, Limes, Pineapples, Tomatoes, and Tomato Based Products  Lose extra weight  Do Not work out near bedtime  Avoid tight-fitting clothes  Limit Aspirin and NSAIDS as they increase stomach irritation  Try eating smaller amounts more often  Avoid eating 3-4 hours before you lie down  Do not snack after dinner  Elevate the head of your bed 8-10 inches

## 2022-06-16 DIAGNOSIS — E11.65 TYPE 2 DIABETES MELLITUS WITH HYPERGLYCEMIA, WITH LONG-TERM CURRENT USE OF INSULIN: ICD-10-CM

## 2022-06-16 DIAGNOSIS — Z79.4 TYPE 2 DIABETES MELLITUS WITH HYPERGLYCEMIA, WITH LONG-TERM CURRENT USE OF INSULIN: ICD-10-CM

## 2022-06-16 RX ORDER — ROSUVASTATIN CALCIUM 20 MG/1
20 TABLET, COATED ORAL DAILY
Qty: 90 TABLET | Refills: 0 | OUTPATIENT
Start: 2022-06-16

## 2022-06-21 DIAGNOSIS — Z79.4 TYPE 2 DIABETES MELLITUS WITH HYPERGLYCEMIA, WITH LONG-TERM CURRENT USE OF INSULIN: ICD-10-CM

## 2022-06-21 DIAGNOSIS — E11.65 TYPE 2 DIABETES MELLITUS WITH HYPERGLYCEMIA, WITH LONG-TERM CURRENT USE OF INSULIN: ICD-10-CM

## 2022-06-21 RX ORDER — ROSUVASTATIN CALCIUM 20 MG/1
20 TABLET, COATED ORAL DAILY
Qty: 90 TABLET | Refills: 3 | Status: SHIPPED | OUTPATIENT
Start: 2022-06-21 | End: 2022-11-11 | Stop reason: SDUPTHER

## 2022-07-27 DIAGNOSIS — Z12.11 COLON CANCER SCREENING: ICD-10-CM

## 2022-08-01 ENCOUNTER — PATIENT MESSAGE (OUTPATIENT)
Dept: ADMINISTRATIVE | Facility: HOSPITAL | Age: 52
End: 2022-08-01
Payer: COMMERCIAL

## 2022-08-24 ENCOUNTER — PATIENT MESSAGE (OUTPATIENT)
Dept: ADMINISTRATIVE | Facility: HOSPITAL | Age: 52
End: 2022-08-24
Payer: COMMERCIAL

## 2022-08-31 ENCOUNTER — PATIENT MESSAGE (OUTPATIENT)
Dept: ADMINISTRATIVE | Facility: HOSPITAL | Age: 52
End: 2022-08-31
Payer: COMMERCIAL

## 2022-10-10 ENCOUNTER — PATIENT MESSAGE (OUTPATIENT)
Dept: ADMINISTRATIVE | Facility: HOSPITAL | Age: 52
End: 2022-10-10
Payer: COMMERCIAL

## 2022-11-11 ENCOUNTER — LAB VISIT (OUTPATIENT)
Dept: LAB | Facility: HOSPITAL | Age: 52
End: 2022-11-11
Payer: COMMERCIAL

## 2022-11-11 ENCOUNTER — OFFICE VISIT (OUTPATIENT)
Dept: ENDOCRINOLOGY | Facility: CLINIC | Age: 52
End: 2022-11-11
Payer: COMMERCIAL

## 2022-11-11 VITALS
OXYGEN SATURATION: 97 % | DIASTOLIC BLOOD PRESSURE: 80 MMHG | HEIGHT: 69 IN | SYSTOLIC BLOOD PRESSURE: 130 MMHG | BODY MASS INDEX: 27.85 KG/M2 | WEIGHT: 188.06 LBS | HEART RATE: 83 BPM

## 2022-11-11 DIAGNOSIS — Z79.4 TYPE 2 DIABETES MELLITUS WITH HYPERGLYCEMIA, WITH LONG-TERM CURRENT USE OF INSULIN: Primary | ICD-10-CM

## 2022-11-11 DIAGNOSIS — E11.65 TYPE 2 DIABETES MELLITUS WITH HYPERGLYCEMIA, WITH LONG-TERM CURRENT USE OF INSULIN: Primary | ICD-10-CM

## 2022-11-11 DIAGNOSIS — Z12.5 PROSTATE CANCER SCREENING: ICD-10-CM

## 2022-11-11 DIAGNOSIS — Z79.4 TYPE 2 DIABETES MELLITUS WITH HYPERGLYCEMIA, WITH LONG-TERM CURRENT USE OF INSULIN: ICD-10-CM

## 2022-11-11 DIAGNOSIS — E78.2 MIXED HYPERLIPIDEMIA: ICD-10-CM

## 2022-11-11 DIAGNOSIS — E11.65 TYPE 2 DIABETES MELLITUS WITH HYPERGLYCEMIA, WITH LONG-TERM CURRENT USE OF INSULIN: ICD-10-CM

## 2022-11-11 PROBLEM — M94.0 COSTOCHONDRITIS: Status: RESOLVED | Noted: 2018-05-06 | Resolved: 2022-11-11

## 2022-11-11 PROBLEM — E87.6 HYPOKALEMIA: Status: RESOLVED | Noted: 2018-05-06 | Resolved: 2022-11-11

## 2022-11-11 PROBLEM — R07.9 ACUTE CHEST PAIN: Status: RESOLVED | Noted: 2018-05-05 | Resolved: 2022-11-11

## 2022-11-11 PROCEDURE — 3075F PR MOST RECENT SYSTOLIC BLOOD PRESS GE 130-139MM HG: ICD-10-PCS | Mod: CPTII,S$GLB,, | Performed by: NURSE PRACTITIONER

## 2022-11-11 PROCEDURE — 3008F PR BODY MASS INDEX (BMI) DOCUMENTED: ICD-10-PCS | Mod: CPTII,S$GLB,, | Performed by: NURSE PRACTITIONER

## 2022-11-11 PROCEDURE — 99214 OFFICE O/P EST MOD 30 MIN: CPT | Mod: S$GLB,,, | Performed by: NURSE PRACTITIONER

## 2022-11-11 PROCEDURE — 99214 PR OFFICE/OUTPT VISIT, EST, LEVL IV, 30-39 MIN: ICD-10-PCS | Mod: S$GLB,,, | Performed by: NURSE PRACTITIONER

## 2022-11-11 PROCEDURE — 3079F DIAST BP 80-89 MM HG: CPT | Mod: CPTII,S$GLB,, | Performed by: NURSE PRACTITIONER

## 2022-11-11 PROCEDURE — 1159F MED LIST DOCD IN RCRD: CPT | Mod: CPTII,S$GLB,, | Performed by: NURSE PRACTITIONER

## 2022-11-11 PROCEDURE — 3046F HEMOGLOBIN A1C LEVEL >9.0%: CPT | Mod: CPTII,S$GLB,, | Performed by: NURSE PRACTITIONER

## 2022-11-11 PROCEDURE — 3066F NEPHROPATHY DOC TX: CPT | Mod: CPTII,S$GLB,, | Performed by: NURSE PRACTITIONER

## 2022-11-11 PROCEDURE — 3066F PR DOCUMENTATION OF TREATMENT FOR NEPHROPATHY: ICD-10-PCS | Mod: CPTII,S$GLB,, | Performed by: NURSE PRACTITIONER

## 2022-11-11 PROCEDURE — 1160F PR REVIEW ALL MEDS BY PRESCRIBER/CLIN PHARMACIST DOCUMENTED: ICD-10-PCS | Mod: CPTII,S$GLB,, | Performed by: NURSE PRACTITIONER

## 2022-11-11 PROCEDURE — 3061F NEG MICROALBUMINURIA REV: CPT | Mod: CPTII,S$GLB,, | Performed by: NURSE PRACTITIONER

## 2022-11-11 PROCEDURE — 3075F SYST BP GE 130 - 139MM HG: CPT | Mod: CPTII,S$GLB,, | Performed by: NURSE PRACTITIONER

## 2022-11-11 PROCEDURE — 3046F PR MOST RECENT HEMOGLOBIN A1C LEVEL > 9.0%: ICD-10-PCS | Mod: CPTII,S$GLB,, | Performed by: NURSE PRACTITIONER

## 2022-11-11 PROCEDURE — 36415 COLL VENOUS BLD VENIPUNCTURE: CPT | Mod: PO | Performed by: NURSE PRACTITIONER

## 2022-11-11 PROCEDURE — 99999 PR PBB SHADOW E&M-EST. PATIENT-LVL III: CPT | Mod: PBBFAC,,, | Performed by: NURSE PRACTITIONER

## 2022-11-11 PROCEDURE — 3008F BODY MASS INDEX DOCD: CPT | Mod: CPTII,S$GLB,, | Performed by: NURSE PRACTITIONER

## 2022-11-11 PROCEDURE — 1159F PR MEDICATION LIST DOCUMENTED IN MEDICAL RECORD: ICD-10-PCS | Mod: CPTII,S$GLB,, | Performed by: NURSE PRACTITIONER

## 2022-11-11 PROCEDURE — 1160F RVW MEDS BY RX/DR IN RCRD: CPT | Mod: CPTII,S$GLB,, | Performed by: NURSE PRACTITIONER

## 2022-11-11 PROCEDURE — 83036 HEMOGLOBIN GLYCOSYLATED A1C: CPT | Performed by: NURSE PRACTITIONER

## 2022-11-11 PROCEDURE — 3079F PR MOST RECENT DIASTOLIC BLOOD PRESSURE 80-89 MM HG: ICD-10-PCS | Mod: CPTII,S$GLB,, | Performed by: NURSE PRACTITIONER

## 2022-11-11 PROCEDURE — 99999 PR PBB SHADOW E&M-EST. PATIENT-LVL III: ICD-10-PCS | Mod: PBBFAC,,, | Performed by: NURSE PRACTITIONER

## 2022-11-11 PROCEDURE — 3061F PR NEG MICROALBUMINURIA RESULT DOCUMENTED/REVIEW: ICD-10-PCS | Mod: CPTII,S$GLB,, | Performed by: NURSE PRACTITIONER

## 2022-11-11 RX ORDER — DEXTROSE 4 G
TABLET,CHEWABLE ORAL
Qty: 1 EACH | Refills: 0 | Status: SHIPPED | OUTPATIENT
Start: 2022-11-11

## 2022-11-11 RX ORDER — SEMAGLUTIDE 1.34 MG/ML
1 INJECTION, SOLUTION SUBCUTANEOUS
Qty: 1 PEN | Refills: 6 | Status: SHIPPED | OUTPATIENT
Start: 2022-11-11 | End: 2023-05-30 | Stop reason: SDUPTHER

## 2022-11-11 RX ORDER — METFORMIN HYDROCHLORIDE 500 MG/1
1000 TABLET, EXTENDED RELEASE ORAL 2 TIMES DAILY
Qty: 360 TABLET | Refills: 4 | Status: SHIPPED | OUTPATIENT
Start: 2022-11-11 | End: 2023-12-22 | Stop reason: SDUPTHER

## 2022-11-11 RX ORDER — LANCETS
EACH MISCELLANEOUS
Qty: 50 EACH | Status: SHIPPED | OUTPATIENT
Start: 2022-11-11

## 2022-11-11 RX ORDER — ROSUVASTATIN CALCIUM 20 MG/1
20 TABLET, COATED ORAL DAILY
Qty: 90 TABLET | Refills: 3 | Status: SHIPPED | OUTPATIENT
Start: 2022-11-11 | End: 2023-12-22 | Stop reason: SDUPTHER

## 2022-11-11 NOTE — PROGRESS NOTES
Subjective:       Patient ID: Roni Keen is a 52 y.o. male.    Chief Complaint: Diabetes    HPI  Pt is a 52 y.o. wm  with a diagnosis of Type 2 diabetes mellitus , as well as chronic conditions pending review including HLP .  Other pertinent medical and social information noted includes, but not limited to: Works on [employer]. .     Interim Events: Pt is new to me. Seen prior by Dr. Sandifer.  Out of meds and test strips.  Thinks he is doing well.  Was checking glcuoses about 1x day at random times and generally running about 120-140. No focal complaints.        Review of Systems   Constitutional:  Negative for appetite change and unexpected weight change.   HENT:  Negative for hearing loss and trouble swallowing.    Eyes:  Negative for photophobia and visual disturbance.   Respiratory:  Negative for cough and shortness of breath.    Cardiovascular:  Negative for chest pain and leg swelling.   Gastrointestinal:  Negative for constipation and diarrhea.   Genitourinary:  Negative for difficulty urinating and urgency.   Musculoskeletal:  Negative for arthralgias and myalgias.   Neurological:  Negative for weakness and numbness.   Psychiatric/Behavioral:  Negative for sleep disturbance. The patient is not nervous/anxious.        Objective:      Physical Exam  Constitutional:       Appearance: Normal appearance.   HENT:      Head: Normocephalic and atraumatic.      Nose: Nose normal.   Eyes:      Extraocular Movements: Extraocular movements intact.      Conjunctiva/sclera: Conjunctivae normal.      Pupils: Pupils are equal, round, and reactive to light.   Cardiovascular:      Rate and Rhythm: Normal rate and regular rhythm.      Heart sounds: Normal heart sounds.   Pulmonary:      Effort: Pulmonary effort is normal.      Breath sounds: Normal breath sounds.   Musculoskeletal:         General: Normal range of motion.      Cervical back: Normal range of motion and neck supple.      Right lower leg: No edema.       "Left lower leg: No edema.      Comments: Feet: no open wounds or calluses.  Good pedal care.   Pedal pulses +2 bilaterally,  vibratory sensation intact bilaterally.     Lymphadenopathy:      Cervical: No cervical adenopathy.   Skin:     General: Skin is warm and dry.   Neurological:      General: No focal deficit present.      Mental Status: He is alert and oriented to person, place, and time.   Psychiatric:         Mood and Affect: Mood normal.         Behavior: Behavior normal.         Thought Content: Thought content normal.         Judgment: Judgment normal.         /80   Pulse 83   Ht 5' 9" (1.753 m)   Wt 85.3 kg (188 lb 0.8 oz)   SpO2 97%   BMI 27.77 kg/m²     Hemoglobin A1C   Date Value Ref Range Status   03/02/2022 10.2 (H) 4.0 - 5.6 % Final     Comment:     ADA Screening Guidelines:  5.7-6.4%  Consistent with prediabetes  >or=6.5%  Consistent with diabetes    High levels of fetal hemoglobin interfere with the HbA1C  assay. Heterozygous hemoglobin variants (HbS, HgC, etc)do  not significantly interfere with this assay.   However, presence of multiple variants may affect accuracy.     10/08/2019 6.9 (H) 4.0 - 5.6 % Final     Comment:     ADA Screening Guidelines:  5.7-6.4%  Consistent with prediabetes  >or=6.5%  Consistent with diabetes  High levels of fetal hemoglobin interfere with the HbA1C  assay. Heterozygous hemoglobin variants (HbS, HgC, etc)do  not significantly interfere with this assay.   However, presence of multiple variants may affect accuracy.         Chemistry        Component Value Date/Time     03/02/2022 1025    K 4.6 03/02/2022 1025     03/02/2022 1025    CO2 28 03/02/2022 1025    BUN 14 03/02/2022 1025    CREATININE 1.0 03/02/2022 1025     (H) 03/02/2022 1025        Component Value Date/Time    CALCIUM 10.1 03/02/2022 1025    ALKPHOS 77 03/02/2022 1025    AST 30 03/02/2022 1025    ALT 34 03/02/2022 1025    BILITOT 0.7 03/02/2022 1025    ESTGFRAFRICA >60.0 " 03/02/2022 1025    EGFRNONAA >60.0 03/02/2022 1025          Lab Results   Component Value Date    CHOL 158 03/02/2022     Lab Results   Component Value Date    HDL 32 (L) 03/02/2022     Lab Results   Component Value Date    LDLCALC 97.0 03/02/2022     Lab Results   Component Value Date    TRIG 145 03/02/2022     Lab Results   Component Value Date    CHOLHDL 20.3 03/02/2022     Lab Results   Component Value Date    MICALBCREAT 11.2 03/02/2022     Lab Results   Component Value Date    TSH 2.359 10/08/2019     No results found for: [id number]    Assessment:         1. Type 2 diabetes mellitus with hyperglycemia, with long-term current use of insulin  semaglutide (OZEMPIC) 1 mg/dose (4 mg/3 mL)    empagliflozin (JARDIANCE) 10 mg tablet    metFORMIN (GLUCOPHAGE-XR) 500 MG ER 24hr tablet    rosuvastatin (CRESTOR) 20 MG tablet    Hemoglobin A1C    Comprehensive Metabolic Panel    Hemoglobin A1C    Lipid Panel    Microalbumin/Creatinine Ratio, Urine    TSH    blood-glucose meter Misc    lancets Misc    blood sugar diagnostic Strp      2. Prostate cancer screening  PSA, Screening      3. Mixed hyperlipidemia            Plan:       .ORDERS 11/11/2022     A1c today.      3 or 6 months based on todays results--and then fasting cmp, lipids, a1c, tsh, urine /

## 2022-11-12 LAB
ESTIMATED AVG GLUCOSE: 146 MG/DL (ref 68–131)
HBA1C MFR BLD: 6.7 % (ref 4–5.6)

## 2023-03-27 DIAGNOSIS — K21.9 GERD WITHOUT ESOPHAGITIS: ICD-10-CM

## 2023-03-27 RX ORDER — OMEPRAZOLE 40 MG/1
40 CAPSULE, DELAYED RELEASE ORAL DAILY
Qty: 90 CAPSULE | Refills: 3 | Status: SHIPPED | OUTPATIENT
Start: 2023-03-27

## 2023-03-27 NOTE — TELEPHONE ENCOUNTER
Care Due:                  Date            Visit Type   Department     Provider  --------------------------------------------------------------------------------                                NP -                              PRIMARY      [facility]  Last Visit: 03-      CARE ([facility])   MEDICINE       Juanjose Koo  Next Visit: None Scheduled  None         None Found                                                            Last  Test          Frequency    Reason                     Performed    Due Date  --------------------------------------------------------------------------------    Office Visit  12 months..  omeprazole...............  03- 03-    [facility] Embedded Care Gaps. Reference number: [id number]. 3/27/2023   12:51:32 PM CDT

## 2023-05-30 DIAGNOSIS — E11.65 TYPE 2 DIABETES MELLITUS WITH HYPERGLYCEMIA, WITH LONG-TERM CURRENT USE OF INSULIN: ICD-10-CM

## 2023-05-30 DIAGNOSIS — Z79.4 TYPE 2 DIABETES MELLITUS WITH HYPERGLYCEMIA, WITH LONG-TERM CURRENT USE OF INSULIN: ICD-10-CM

## 2023-05-30 RX ORDER — SEMAGLUTIDE 1.34 MG/ML
1 INJECTION, SOLUTION SUBCUTANEOUS
Qty: 1 EACH | Refills: 6 | Status: SHIPPED | OUTPATIENT
Start: 2023-05-30 | End: 2023-12-22 | Stop reason: SDUPTHER

## 2023-12-20 DIAGNOSIS — E11.65 TYPE 2 DIABETES MELLITUS WITH HYPERGLYCEMIA, WITH LONG-TERM CURRENT USE OF INSULIN: ICD-10-CM

## 2023-12-20 DIAGNOSIS — Z79.4 TYPE 2 DIABETES MELLITUS WITH HYPERGLYCEMIA, WITH LONG-TERM CURRENT USE OF INSULIN: ICD-10-CM

## 2023-12-20 RX ORDER — SEMAGLUTIDE 1.34 MG/ML
1 INJECTION, SOLUTION SUBCUTANEOUS
Qty: 1 EACH | Refills: 6 | OUTPATIENT
Start: 2023-12-20 | End: 2024-12-19

## 2023-12-20 RX ORDER — ROSUVASTATIN CALCIUM 20 MG/1
20 TABLET, COATED ORAL DAILY
Qty: 90 TABLET | Refills: 3 | OUTPATIENT
Start: 2023-12-20

## 2023-12-20 RX ORDER — METFORMIN HYDROCHLORIDE 500 MG/1
1000 TABLET, EXTENDED RELEASE ORAL 2 TIMES DAILY
Qty: 360 TABLET | Refills: 4 | OUTPATIENT
Start: 2023-12-20

## 2023-12-22 DIAGNOSIS — Z79.4 TYPE 2 DIABETES MELLITUS WITH HYPERGLYCEMIA, WITH LONG-TERM CURRENT USE OF INSULIN: ICD-10-CM

## 2023-12-22 DIAGNOSIS — E11.65 TYPE 2 DIABETES MELLITUS WITH HYPERGLYCEMIA, WITH LONG-TERM CURRENT USE OF INSULIN: ICD-10-CM

## 2023-12-22 RX ORDER — METFORMIN HYDROCHLORIDE 500 MG/1
1000 TABLET, EXTENDED RELEASE ORAL 2 TIMES DAILY
Qty: 360 TABLET | Refills: 0 | Status: SHIPPED | OUTPATIENT
Start: 2023-12-22 | End: 2024-01-03

## 2023-12-22 RX ORDER — ROSUVASTATIN CALCIUM 20 MG/1
20 TABLET, COATED ORAL DAILY
Qty: 90 TABLET | Refills: 0 | Status: SHIPPED | OUTPATIENT
Start: 2023-12-22

## 2023-12-22 RX ORDER — SEMAGLUTIDE 1.34 MG/ML
1 INJECTION, SOLUTION SUBCUTANEOUS
Qty: 1 EACH | Refills: 0 | Status: SHIPPED | OUTPATIENT
Start: 2023-12-22 | End: 2024-01-03

## 2023-12-22 NOTE — TELEPHONE ENCOUNTER
----- Message from Jael Bond sent at 12/22/2023  9:49 AM CST -----  Contact: self  Type:  RX Refill Request    Who Called:  patient   Refill or New Rx:  refill  RX Name and Strength:  semaglutide (OZEMPIC) 1 mg/dose (4 mg/3 mL), empagliflozin (JARDIANCE) 10 mg tablet, metFORMIN (GLUCOPHAGE-XR) 500 MG ER 24hr tablet, and rosuvastatin (CRESTOR) 20 MG tablet  How is the patient currently taking it? (ex. 1XDay):  as directed  Is this a 30 day or 90 day RX:  90  Preferred Pharmacy with phone number:    [facility]  [address] [facility] [place] [address]  Phone: [phone] Fax: [phone]  Local or Mail Order:  local  Ordering Provider:  Susan Pham Call Back Number:  [phone]  Additional Information: He is having labs done on 12/23/23 so maybe you can just order his reg amount of medicines and he will see you in April if not please follow him with his meds until then and thanks

## 2023-12-23 ENCOUNTER — LAB VISIT (OUTPATIENT)
Dept: LAB | Facility: HOSPITAL | Age: 53
End: 2023-12-23
Attending: NURSE PRACTITIONER
Payer: COMMERCIAL

## 2023-12-23 DIAGNOSIS — Z12.5 PROSTATE CANCER SCREENING: ICD-10-CM

## 2023-12-23 DIAGNOSIS — E11.65 TYPE 2 DIABETES MELLITUS WITH HYPERGLYCEMIA, WITH LONG-TERM CURRENT USE OF INSULIN: ICD-10-CM

## 2023-12-23 DIAGNOSIS — Z79.4 TYPE 2 DIABETES MELLITUS WITH HYPERGLYCEMIA, WITH LONG-TERM CURRENT USE OF INSULIN: ICD-10-CM

## 2023-12-23 LAB
ALBUMIN/CREAT UR: NORMAL UG/MG (ref 0–30)
COMPLEXED PSA SERPL-MCNC: 4.8 NG/ML (ref 0–4)
CREAT UR-MCNC: 86 MG/DL (ref 23–375)
MICROALBUMIN UR DL<=1MG/L-MCNC: <5 UG/ML

## 2023-12-23 PROCEDURE — 84153 ASSAY OF PSA TOTAL: CPT | Performed by: NURSE PRACTITIONER

## 2023-12-23 PROCEDURE — 36415 COLL VENOUS BLD VENIPUNCTURE: CPT | Mod: PO | Performed by: NURSE PRACTITIONER

## 2023-12-23 PROCEDURE — 82043 UR ALBUMIN QUANTITATIVE: CPT | Performed by: NURSE PRACTITIONER

## 2024-01-03 DIAGNOSIS — E11.65 TYPE 2 DIABETES MELLITUS WITH HYPERGLYCEMIA, WITH LONG-TERM CURRENT USE OF INSULIN: ICD-10-CM

## 2024-01-03 DIAGNOSIS — Z79.4 TYPE 2 DIABETES MELLITUS WITH HYPERGLYCEMIA, WITH LONG-TERM CURRENT USE OF INSULIN: ICD-10-CM

## 2024-01-03 RX ORDER — METFORMIN HYDROCHLORIDE 500 MG/1
1000 TABLET, EXTENDED RELEASE ORAL 2 TIMES DAILY
Qty: 360 TABLET | Refills: 0 | Status: SHIPPED | OUTPATIENT
Start: 2024-01-03

## 2024-01-03 RX ORDER — SEMAGLUTIDE 1.34 MG/ML
1 INJECTION, SOLUTION SUBCUTANEOUS
Qty: 3 EACH | Refills: 0 | Status: SHIPPED | OUTPATIENT
Start: 2024-01-03 | End: 2025-01-02

## 2024-04-09 ENCOUNTER — LAB VISIT (OUTPATIENT)
Dept: LAB | Facility: HOSPITAL | Age: 54
End: 2024-04-09
Attending: NURSE PRACTITIONER
Payer: COMMERCIAL

## 2024-04-09 ENCOUNTER — OFFICE VISIT (OUTPATIENT)
Dept: ENDOCRINOLOGY | Facility: CLINIC | Age: 54
End: 2024-04-09
Payer: COMMERCIAL

## 2024-04-09 VITALS
HEIGHT: 69 IN | WEIGHT: 189.5 LBS | BODY MASS INDEX: 28.07 KG/M2 | OXYGEN SATURATION: 97 % | DIASTOLIC BLOOD PRESSURE: 86 MMHG | HEART RATE: 73 BPM | SYSTOLIC BLOOD PRESSURE: 128 MMHG

## 2024-04-09 DIAGNOSIS — E11.65 TYPE 2 DIABETES MELLITUS WITH HYPERGLYCEMIA, WITH LONG-TERM CURRENT USE OF INSULIN: ICD-10-CM

## 2024-04-09 DIAGNOSIS — Z12.5 SCREENING FOR PROSTATE CANCER: ICD-10-CM

## 2024-04-09 DIAGNOSIS — R97.20 ELEVATED PSA, LESS THAN 10 NG/ML: ICD-10-CM

## 2024-04-09 DIAGNOSIS — Z79.4 TYPE 2 DIABETES MELLITUS WITH HYPERGLYCEMIA, WITH LONG-TERM CURRENT USE OF INSULIN: ICD-10-CM

## 2024-04-09 DIAGNOSIS — E11.9 TYPE 2 DIABETES MELLITUS WITHOUT COMPLICATION, WITHOUT LONG-TERM CURRENT USE OF INSULIN: Primary | ICD-10-CM

## 2024-04-09 DIAGNOSIS — E11.9 TYPE 2 DIABETES MELLITUS WITHOUT COMPLICATION, WITHOUT LONG-TERM CURRENT USE OF INSULIN: ICD-10-CM

## 2024-04-09 LAB
ALBUMIN/CREAT UR: 6.5 UG/MG (ref 0–30)
BACTERIA #/AREA URNS HPF: NORMAL /HPF
BILIRUB UR QL STRIP: NEGATIVE
CLARITY UR: CLEAR
COLOR UR: YELLOW
CREAT UR-MCNC: 93 MG/DL (ref 23–375)
GLUCOSE UR QL STRIP: ABNORMAL
HGB UR QL STRIP: NEGATIVE
KETONES UR QL STRIP: ABNORMAL
LEUKOCYTE ESTERASE UR QL STRIP: NEGATIVE
MICROALBUMIN UR DL<=1MG/L-MCNC: 6 UG/ML
MICROSCOPIC COMMENT: NORMAL
NITRITE UR QL STRIP: NEGATIVE
PH UR STRIP: 6 [PH] (ref 5–8)
PROT UR QL STRIP: NEGATIVE
SP GR UR STRIP: 1.02 (ref 1–1.03)
URN SPEC COLLECT METH UR: ABNORMAL
WBC #/AREA URNS HPF: 1 /HPF (ref 0–5)
YEAST URNS QL MICRO: NORMAL

## 2024-04-09 PROCEDURE — 3079F DIAST BP 80-89 MM HG: CPT | Mod: CPTII,S$GLB,, | Performed by: NURSE PRACTITIONER

## 2024-04-09 PROCEDURE — 99214 OFFICE O/P EST MOD 30 MIN: CPT | Mod: S$GLB,,, | Performed by: NURSE PRACTITIONER

## 2024-04-09 PROCEDURE — 3074F SYST BP LT 130 MM HG: CPT | Mod: CPTII,S$GLB,, | Performed by: NURSE PRACTITIONER

## 2024-04-09 PROCEDURE — 99999 PR PBB SHADOW E&M-EST. PATIENT-LVL III: CPT | Mod: PBBFAC,,, | Performed by: NURSE PRACTITIONER

## 2024-04-09 PROCEDURE — 1159F MED LIST DOCD IN RCRD: CPT | Mod: CPTII,S$GLB,, | Performed by: NURSE PRACTITIONER

## 2024-04-09 PROCEDURE — 81000 URINALYSIS NONAUTO W/SCOPE: CPT | Mod: PO | Performed by: NURSE PRACTITIONER

## 2024-04-09 PROCEDURE — 3008F BODY MASS INDEX DOCD: CPT | Mod: CPTII,S$GLB,, | Performed by: NURSE PRACTITIONER

## 2024-04-09 PROCEDURE — 82043 UR ALBUMIN QUANTITATIVE: CPT | Performed by: NURSE PRACTITIONER

## 2024-04-09 RX ORDER — METFORMIN HYDROCHLORIDE 500 MG/1
1000 TABLET, EXTENDED RELEASE ORAL 2 TIMES DAILY
Qty: 360 TABLET | Refills: 3 | Status: SHIPPED | OUTPATIENT
Start: 2024-04-09

## 2024-04-09 RX ORDER — SEMAGLUTIDE 1.34 MG/ML
1 INJECTION, SOLUTION SUBCUTANEOUS
Qty: 3 EACH | Refills: 4 | Status: SHIPPED | OUTPATIENT
Start: 2024-04-09 | End: 2025-04-09

## 2024-04-09 NOTE — PROGRESS NOTES
Subjective:       Patient ID: Roni Keen is a 53 y.o. male.    Chief Complaint: No chief complaint on file.    HPI  Pt is a 53 y.o. wm  with a diagnosis of Type 2 diabetes mellitus , as well as chronic conditions pending review including HLP .  Other pertinent medical and social information noted includes, but not limited to: Works on [employer]. .     Interim Events: April 9, 2024:  Was out of Ozempic for several months. But just restarted at 0.5 mg.  No focal complaints. No hypoglycemia.   Checks glucoses randomly--usually b/w 140-200, 170 today fasting.     Current DM meds:   metformin 1000 mg xr bid, ozempic 1mg, jardiance 10 mg        Failed DM meds:  na       Back up plan for insulin pump hiatus:   Statin: rosuvastatin 20 mg        Not tolerated statin : na   ACE/ARB:none        Not tolerated ACE/ARB: na   Known Diabetic complications: none         Passwords for [employer]: na     Nov 11, 2022: Pt is new to me. Seen prior by Dr. Sandifer.  Out of meds and test strips.  Thinks he is doing well.  Was checking glcuoses about 1x day at random times and generally running about 120-140. No focal complaints.        Review of Systems   Constitutional:  Negative for appetite change and unexpected weight change.   HENT:  Negative for hearing loss and trouble swallowing.    Eyes:  Negative for photophobia and visual disturbance.   Respiratory:  Negative for cough and shortness of breath.    Cardiovascular:  Negative for chest pain and leg swelling.   Gastrointestinal:  Negative for constipation and diarrhea.   Genitourinary:  Positive for frequency and urgency. Negative for difficulty urinating. Genital sores: every 2 hours and several times a night.  Musculoskeletal:  Negative for arthralgias and myalgias.   Neurological:  Negative for weakness and numbness.   Psychiatric/Behavioral:  Negative for sleep disturbance. The patient is not nervous/anxious.          Objective:      Physical Exam  Constitutional:       " Appearance: Normal appearance. He is normal weight.   HENT:      Head: Normocephalic and atraumatic.      Nose: Nose normal.      Mouth/Throat:      Mouth: Mucous membranes are moist.      Pharynx: Oropharynx is clear.   Eyes:      Extraocular Movements: Extraocular movements intact.      Conjunctiva/sclera: Conjunctivae normal.      Pupils: Pupils are equal, round, and reactive to light.   Neck:      Vascular: No carotid bruit.   Cardiovascular:      Rate and Rhythm: Normal rate and regular rhythm.      Pulses: Normal pulses.      Heart sounds: Normal heart sounds.   Pulmonary:      Effort: Pulmonary effort is normal.      Breath sounds: Normal breath sounds.   Musculoskeletal:         General: Normal range of motion.      Cervical back: Normal range of motion and neck supple.      Right lower leg: No edema.      Left lower leg: No edema.      Comments: Feet: no open wounds or calluses.  Good pedal care.   Pedal pulses +2 bilaterally,  vibratory sensation intact bilaterally.     Lymphadenopathy:      Cervical: No cervical adenopathy.   Skin:     General: Skin is warm and dry.   Neurological:      General: No focal deficit present.      Mental Status: He is alert and oriented to person, place, and time.   Psychiatric:         Mood and Affect: Mood normal.         Behavior: Behavior normal.         Thought Content: Thought content normal.         Judgment: Judgment normal.           /86 (BP Location: Right arm, Patient Position: Sitting, BP Method: Large (Manual))   Pulse 73   Ht 5' 9" (1.753 m)   Wt 86 kg (189 lb 7.8 oz)   SpO2 97%   BMI 27.98 kg/m²     Hemoglobin A1C   Date Value Ref Range Status   11/11/2022 6.7 (H) 4.0 - 5.6 % Final     Comment:     ADA Screening Guidelines:  5.7-6.4%  Consistent with prediabetes  >or=6.5%  Consistent with diabetes    High levels of fetal hemoglobin interfere with the HbA1C  assay. Heterozygous hemoglobin variants (HbS, HgC, etc)do  not significantly interfere with " "this assay.   However, presence of multiple variants may affect accuracy.     03/02/2022 10.2 (H) 4.0 - 5.6 % Final     Comment:     ADA Screening Guidelines:  5.7-6.4%  Consistent with prediabetes  >or=6.5%  Consistent with diabetes    High levels of fetal hemoglobin interfere with the HbA1C  assay. Heterozygous hemoglobin variants (HbS, HgC, etc)do  not significantly interfere with this assay.   However, presence of multiple variants may affect accuracy.     10/08/2019 6.9 (H) 4.0 - 5.6 % Final     Comment:     ADA Screening Guidelines:  5.7-6.4%  Consistent with prediabetes  >or=6.5%  Consistent with diabetes  High levels of fetal hemoglobin interfere with the HbA1C  assay. Heterozygous hemoglobin variants (HbS, HgC, etc)do  not significantly interfere with this assay.   However, presence of multiple variants may affect accuracy.         Chemistry        Component Value Date/Time     03/02/2022 1025    K 4.6 03/02/2022 1025     03/02/2022 1025    CO2 28 03/02/2022 1025    BUN 14 03/02/2022 1025    CREATININE 1.0 03/02/2022 1025     (H) 03/02/2022 1025        Component Value Date/Time    CALCIUM 10.1 03/02/2022 1025    ALKPHOS 77 03/02/2022 1025    AST 30 03/02/2022 1025    ALT 34 03/02/2022 1025    BILITOT 0.7 03/02/2022 1025    ESTGFRAFRICA >60.0 03/02/2022 1025    EGFRNONAA >60.0 03/02/2022 1025          Lab Results   Component Value Date    CHOL 158 03/02/2022     Lab Results   Component Value Date    HDL 32 (L) 03/02/2022     Lab Results   Component Value Date    LDLCALC 97.0 03/02/2022     Lab Results   Component Value Date    TRIG 145 03/02/2022     Lab Results   Component Value Date    CHOLHDL 20.3 03/02/2022     Lab Results   Component Value Date    MICALBCREAT Unable to calculate 12/23/2023     Lab Results   Component Value Date    TSH 2.359 10/08/2019     No results found for: "[id number]"    Assessment:         1. Type 2 diabetes mellitus without complication, without long-term " current use of insulin  Comprehensive Metabolic Panel    Hemoglobin A1C    Lipid Panel    Microalbumin/Creatinine Ratio, Urine    TSH    Urinalysis      2. Screening for prostate cancer  PSA, Screening      3. Type 2 diabetes mellitus with hyperglycemia, with long-term current use of insulin  empagliflozin (JARDIANCE) 10 mg tablet    metFORMIN (GLUCOPHAGE-XR) 500 MG ER 24hr tablet    semaglutide (OZEMPIC) 1 mg/dose (4 mg/3 mL)      4. Elevated PSA, less than 10 ng/ml              Plan:       Advised to take Jardicance in the AM--this is partially contributed to nocturnal polyruria   Cont 1 mg ozempic  Cont 10 mg jardiance  Cont metformin 1000 mg xr bid.     ORDERS 04/09/2024       Fasting cmp, lipids, A1c, tsh, psa, urine m/c, ua   today     6 mo with A1c  prior      .

## 2024-04-10 ENCOUNTER — PATIENT MESSAGE (OUTPATIENT)
Dept: ENDOCRINOLOGY | Facility: CLINIC | Age: 54
End: 2024-04-10
Payer: COMMERCIAL

## 2024-04-10 DIAGNOSIS — E11.9 TYPE 2 DIABETES MELLITUS WITHOUT COMPLICATION, WITHOUT LONG-TERM CURRENT USE OF INSULIN: Primary | ICD-10-CM

## 2024-04-10 DIAGNOSIS — R97.20 ELEVATED PSA, LESS THAN 10 NG/ML: ICD-10-CM

## 2024-04-17 ENCOUNTER — OFFICE VISIT (OUTPATIENT)
Dept: UROLOGY | Facility: CLINIC | Age: 54
End: 2024-04-17
Payer: COMMERCIAL

## 2024-04-17 VITALS — WEIGHT: 189.13 LBS | HEIGHT: 69 IN | BODY MASS INDEX: 28.01 KG/M2

## 2024-04-17 DIAGNOSIS — R97.20 ELEVATED PSA, LESS THAN 10 NG/ML: ICD-10-CM

## 2024-04-17 PROCEDURE — 3052F HG A1C>EQUAL 8.0%<EQUAL 9.0%: CPT | Mod: CPTII,S$GLB,,

## 2024-04-17 PROCEDURE — 3008F BODY MASS INDEX DOCD: CPT | Mod: CPTII,S$GLB,,

## 2024-04-17 PROCEDURE — 3066F NEPHROPATHY DOC TX: CPT | Mod: CPTII,S$GLB,,

## 2024-04-17 PROCEDURE — 1160F RVW MEDS BY RX/DR IN RCRD: CPT | Mod: CPTII,S$GLB,,

## 2024-04-17 PROCEDURE — 99999 PR PBB SHADOW E&M-EST. PATIENT-LVL III: CPT | Mod: PBBFAC,,,

## 2024-04-17 PROCEDURE — 99204 OFFICE O/P NEW MOD 45 MIN: CPT | Mod: S$GLB,,,

## 2024-04-17 PROCEDURE — 1159F MED LIST DOCD IN RCRD: CPT | Mod: CPTII,S$GLB,,

## 2024-04-17 PROCEDURE — 3061F NEG MICROALBUMINURIA REV: CPT | Mod: CPTII,S$GLB,,

## 2024-04-17 NOTE — PROGRESS NOTES
[facility] Note  Staff: KAREN Hernandez    PCP: MD Hayes    Chief Complaint: Elevated PSA    Subjective:        HPI: Roni Keen is a 53 y.o. male NEW PATIENT presents today for an elevated PSA. His most recent PSA is from 4/9/2024 at 5.3. Prior PSA from 12/23/2023 at 4.8. He has not had further evaluation. We discussed PSA and its limitations. We discussed further evaluation with prostate MRI versus prostate needle biopsy. He denies a family history of prostate cancer. He denies dysuria, hematuria, fever, flank pain, abd pain, incontinence, and difficulty urinating.     Questions asked the pt during ov today:  Urgency: No, urge incontinence? No  Dysuria: No  Gross Hematuria:No  Straining:No, Hesistancy:No, Intermittency:No}, Weak stream:No    Last PSA Screening:   Lab Results   Component Value Date    PSA 5.3 (H) 04/09/2024    PSA 4.8 (H) 12/23/2023       History of Kidney Stones?:  Yes    Constipation issues?:  No    REVIEW OF SYSTEMS:  Review of Systems   Constitutional: Negative.  Negative for chills and fever.   HENT: Negative.     Eyes: Negative.    Respiratory: Negative.     Cardiovascular: Negative.    Gastrointestinal: Negative.  Negative for abdominal pain, constipation, diarrhea, nausea and vomiting.   Genitourinary: Negative.  Negative for dysuria, flank pain, frequency, hematuria and urgency.   Musculoskeletal: Negative.  Negative for back pain.   Skin: Negative.    Neurological: Negative.    Endo/Heme/Allergies: Negative.    Psychiatric/Behavioral: Negative.         PMHx:  Past Medical History:   Diagnosis Date    Asthma     as a child    Obesity     ST segment depression     Tobacco use     Type 2 diabetes mellitus without complication, without long-term current use of insulin        PSHx:  Past Surgical History:   Procedure Laterality Date    ROTATOR CUFF REPAIR Right        Fam Hx:   malignancies: No    kidney stones: No     Soc Hx:  , lives in  [place]    Allergies:  Patient has no known allergies.    Medications: reviewed     Objective:   There were no vitals filed for this visit.    Physical Exam  Constitutional:       Appearance: Normal appearance.   HENT:      Head: Normocephalic.      Mouth/Throat:      Mouth: Mucous membranes are moist.   Eyes:      Conjunctiva/sclera: Conjunctivae normal.   Pulmonary:      Effort: Pulmonary effort is normal.   Abdominal:      General: There is no distension.      Palpations: Abdomen is soft.      Tenderness: There is no abdominal tenderness.   Musculoskeletal:         General: Normal range of motion.      Cervical back: Normal range of motion.   Skin:     General: Skin is warm.   Neurological:      Mental Status: He is alert and oriented to person, place, and time.   Psychiatric:         Mood and Affect: Mood normal.         Behavior: Behavior normal.           Assessment:       1. Elevated PSA, less than 10 ng/ml          Plan:     Prostate MRI ordered and pt given paperwork to call [facility] to schedule    F/u as needed per treatment plan    [facility]: Active    KAREN Hernandez

## 2024-04-18 ENCOUNTER — PATIENT MESSAGE (OUTPATIENT)
Dept: UROLOGY | Facility: CLINIC | Age: 54
End: 2024-04-18
Payer: COMMERCIAL

## 2024-04-23 DIAGNOSIS — E11.65 TYPE 2 DIABETES MELLITUS WITH HYPERGLYCEMIA, WITH LONG-TERM CURRENT USE OF INSULIN: ICD-10-CM

## 2024-04-23 DIAGNOSIS — Z79.4 TYPE 2 DIABETES MELLITUS WITH HYPERGLYCEMIA, WITH LONG-TERM CURRENT USE OF INSULIN: ICD-10-CM

## 2024-04-24 RX ORDER — ROSUVASTATIN CALCIUM 20 MG/1
20 TABLET, COATED ORAL DAILY
Qty: 90 TABLET | Refills: 0 | Status: SHIPPED | OUTPATIENT
Start: 2024-04-24

## 2024-06-11 DIAGNOSIS — K21.9 GERD WITHOUT ESOPHAGITIS: ICD-10-CM

## 2024-06-11 RX ORDER — OMEPRAZOLE 40 MG/1
40 CAPSULE, DELAYED RELEASE ORAL DAILY
Qty: 90 CAPSULE | Refills: 3 | Status: SHIPPED | OUTPATIENT
Start: 2024-06-11

## 2024-06-11 NOTE — TELEPHONE ENCOUNTER
Care Due:                  Date            Visit Type   Department     Provider  --------------------------------------------------------------------------------    Last Visit: None Found      None         None Found  Next Visit: None Scheduled  None         None Found                                                            Last  Test          Frequency    Reason                     Performed    Due Date  --------------------------------------------------------------------------------    Office Visit  15 months..  omeprazole...............  Not Found    Overdue    [employer] Embedded Care Due Messages. Reference number: [id number].   6/11/2024 1:40:04 PM CDT

## 2024-08-02 DIAGNOSIS — Z79.4 TYPE 2 DIABETES MELLITUS WITH HYPERGLYCEMIA, WITH LONG-TERM CURRENT USE OF INSULIN: ICD-10-CM

## 2024-08-02 DIAGNOSIS — E11.65 TYPE 2 DIABETES MELLITUS WITH HYPERGLYCEMIA, WITH LONG-TERM CURRENT USE OF INSULIN: ICD-10-CM

## 2024-08-02 RX ORDER — ROSUVASTATIN CALCIUM 20 MG/1
20 TABLET, COATED ORAL DAILY
Qty: 90 TABLET | Refills: 0 | Status: SHIPPED | OUTPATIENT
Start: 2024-08-02

## 2024-08-02 RX ORDER — ROSUVASTATIN CALCIUM 20 MG/1
20 TABLET, COATED ORAL DAILY
Qty: 90 TABLET | Refills: 0 | Status: CANCELLED | OUTPATIENT
Start: 2024-08-02

## 2024-09-27 ENCOUNTER — OFFICE VISIT (OUTPATIENT)
Dept: FAMILY MEDICINE | Facility: CLINIC | Age: 54
End: 2024-09-27
Payer: COMMERCIAL

## 2024-09-27 VITALS
HEART RATE: 94 BPM | RESPIRATION RATE: 18 BRPM | WEIGHT: 193.88 LBS | DIASTOLIC BLOOD PRESSURE: 80 MMHG | OXYGEN SATURATION: 97 % | BODY MASS INDEX: 28.72 KG/M2 | SYSTOLIC BLOOD PRESSURE: 130 MMHG | HEIGHT: 69 IN

## 2024-09-27 DIAGNOSIS — E11.65 TYPE 2 DIABETES MELLITUS WITH HYPERGLYCEMIA, WITH LONG-TERM CURRENT USE OF INSULIN: ICD-10-CM

## 2024-09-27 DIAGNOSIS — M25.561 PAIN AND SWELLING OF RIGHT KNEE: Primary | ICD-10-CM

## 2024-09-27 DIAGNOSIS — E78.49 OTHER HYPERLIPIDEMIA: ICD-10-CM

## 2024-09-27 DIAGNOSIS — Z79.4 TYPE 2 DIABETES MELLITUS WITH HYPERGLYCEMIA, WITH LONG-TERM CURRENT USE OF INSULIN: ICD-10-CM

## 2024-09-27 DIAGNOSIS — M25.461 PAIN AND SWELLING OF RIGHT KNEE: Primary | ICD-10-CM

## 2024-09-27 PROCEDURE — 3008F BODY MASS INDEX DOCD: CPT | Mod: CPTII,S$GLB,, | Performed by: FAMILY MEDICINE

## 2024-09-27 PROCEDURE — 1159F MED LIST DOCD IN RCRD: CPT | Mod: CPTII,S$GLB,, | Performed by: FAMILY MEDICINE

## 2024-09-27 PROCEDURE — 3061F NEG MICROALBUMINURIA REV: CPT | Mod: CPTII,S$GLB,, | Performed by: FAMILY MEDICINE

## 2024-09-27 PROCEDURE — 3052F HG A1C>EQUAL 8.0%<EQUAL 9.0%: CPT | Mod: CPTII,S$GLB,, | Performed by: FAMILY MEDICINE

## 2024-09-27 PROCEDURE — 99999 PR PBB SHADOW E&M-EST. PATIENT-LVL IV: CPT | Mod: PBBFAC,,, | Performed by: FAMILY MEDICINE

## 2024-09-27 PROCEDURE — 99214 OFFICE O/P EST MOD 30 MIN: CPT | Mod: S$GLB,,, | Performed by: FAMILY MEDICINE

## 2024-09-27 PROCEDURE — 3066F NEPHROPATHY DOC TX: CPT | Mod: CPTII,S$GLB,, | Performed by: FAMILY MEDICINE

## 2024-09-27 PROCEDURE — 3075F SYST BP GE 130 - 139MM HG: CPT | Mod: CPTII,S$GLB,, | Performed by: FAMILY MEDICINE

## 2024-09-27 PROCEDURE — 3079F DIAST BP 80-89 MM HG: CPT | Mod: CPTII,S$GLB,, | Performed by: FAMILY MEDICINE

## 2024-09-27 NOTE — LETTER
[facility]  [address]  [place] [address]  Phone: [phone]  Fax: [phone] September 27, 2024    Roni Keen  [address] [place] [address] [place] [id number]      To Whom It May Concern:    Roni Keen was seen in the office on 09/27/2024 and he will be able to return to work on 09/30/2024.    If you have any questions or concerns, please feel free to call my office.    Sincerely,        Daniela Timmons MD

## 2024-09-28 ENCOUNTER — HOSPITAL ENCOUNTER (OUTPATIENT)
Dept: RADIOLOGY | Facility: HOSPITAL | Age: 54
Discharge: HOME | End: 2024-09-28
Attending: FAMILY MEDICINE
Payer: COMMERCIAL

## 2024-09-28 DIAGNOSIS — M25.561 PAIN AND SWELLING OF RIGHT KNEE: ICD-10-CM

## 2024-09-28 DIAGNOSIS — M25.461 PAIN AND SWELLING OF RIGHT KNEE: ICD-10-CM

## 2024-09-28 PROCEDURE — 73562 X-RAY EXAM OF KNEE 3: CPT | Mod: TC,FY,PO,RT

## 2024-09-28 PROCEDURE — 73562 X-RAY EXAM OF KNEE 3: CPT | Mod: 26,RT,, | Performed by: RADIOLOGY

## 2024-10-02 NOTE — PROGRESS NOTES
Assessment:       1. Pain and swelling of right knee    2. Other hyperlipidemia    3. Type 2 diabetes mellitus with hyperglycemia, with long-term current use of insulin        Assessment & Plan  Pain and swelling of right knee: New problem workup needed  -     Uric Acid; Future; Expected date: 09/27/2024  -     Basic Metabolic Panel; Future; Expected date: 09/27/2024  -     X-Ray Knee 3 View Right; Future; Expected date: 09/27/2024  -     Ambulatory referral/consult to Orthopedics; Future; Expected date: 10/04/2024  -     CBC Auto Differential; Future; Expected date: 09/27/2024    Other hyperlipidemia: Uncontrolled    Type 2 diabetes mellitus with hyperglycemia, with long-term current use of insulin: Uncontrolled    The patient reports swelling and pressure in the right knee for the past couple of weeks without significant pain or redness. Bursitis is suspected, but gout is also considered as a differential diagnosis. Blood work will be conducted to check uric acid levels. An x-ray of the right knee is scheduled for tomorrow at 8:00 am. A referral to an orthopedic specialist will be made for potential fluid drainage.    The patient reports being diabetic and has a history of elevated A1c levels, with the last reading being 9.0. He mentions that his A1c was high due to not receiving his medications for 3 months. He is advised to continue monitoring his blood sugar levels and maintain a healthy diet.    The patient is on cholesterol medication. Elevated cholesterol levels were noted in April 2024. He is advised to continue his current medication regimen and maintain a healthy diet.                   The patient's BMI has been recorded in the chart. The patient has been provided educational materials regarding the benefits of attaining and maintaining a normal weight. We will continue to address and follow this issue during follow up visits.   Patient agreed with assessment and plan. Patient verbalized understanding.      Subjective:       Patient ID: Roni Keen is a 54 y.o. male.    Chief Complaint: Edema (knee)    Edema      History of Present Illness  The patient presents for evaluation of right knee swelling.    He has been experiencing swelling in his right knee for the past few weeks, which he believes may be due to fluid accumulation. He reports a sensation of pressure but denies any redness.    Supplemental Information  He is on cholesterol medicine. He is a diabetic. His A1c was 9.0 last time. Normally it is about 7. He did not take his medicines for 3 months because he could not get a doctor's appointment.       Past medical history, past social history was reviewed and discussed with the patient.    Review of Systems    Objective:      Physical Exam  Constitutional:       General: He is not in acute distress.     Appearance: Normal appearance. He is not toxic-appearing.   HENT:      Head: Normocephalic and atraumatic.   Musculoskeletal:         General: Swelling (Right knee) and tenderness (Right knee) present.   Neurological:      Mental Status: He is alert.         Physical Exam       Results  Laboratory Studies  A1c was 9.0.     This note was generated with the assistance of ambient listening technology. Verbal consent was obtained by the patient and accompanying visitor(s) for the recording of patient appointment to facilitate this note. I attest to having reviewed and edited the generated note for accuracy, though some syntax or spelling errors may persist. Please contact the author of this note for any clarification.

## 2024-10-31 ENCOUNTER — OFFICE VISIT (OUTPATIENT)
Dept: ORTHOPEDICS | Facility: CLINIC | Age: 54
End: 2024-10-31
Payer: COMMERCIAL

## 2024-10-31 DIAGNOSIS — E11.65 TYPE 2 DIABETES MELLITUS WITH HYPERGLYCEMIA, WITH LONG-TERM CURRENT USE OF INSULIN: ICD-10-CM

## 2024-10-31 DIAGNOSIS — M25.561 PAIN AND SWELLING OF RIGHT KNEE: Primary | ICD-10-CM

## 2024-10-31 DIAGNOSIS — Z79.4 TYPE 2 DIABETES MELLITUS WITH HYPERGLYCEMIA, WITH LONG-TERM CURRENT USE OF INSULIN: ICD-10-CM

## 2024-10-31 DIAGNOSIS — M25.461 PAIN AND SWELLING OF RIGHT KNEE: Primary | ICD-10-CM

## 2024-10-31 PROCEDURE — 99999 PR PBB SHADOW E&M-EST. PATIENT-LVL II: CPT | Mod: PBBFAC,,, | Performed by: ORTHOPAEDIC SURGERY

## 2024-10-31 PROCEDURE — 99203 OFFICE O/P NEW LOW 30 MIN: CPT | Mod: S$GLB,,, | Performed by: ORTHOPAEDIC SURGERY

## 2024-10-31 PROCEDURE — 3052F HG A1C>EQUAL 8.0%<EQUAL 9.0%: CPT | Mod: CPTII,S$GLB,, | Performed by: ORTHOPAEDIC SURGERY

## 2024-10-31 PROCEDURE — 3066F NEPHROPATHY DOC TX: CPT | Mod: CPTII,S$GLB,, | Performed by: ORTHOPAEDIC SURGERY

## 2024-10-31 PROCEDURE — 3061F NEG MICROALBUMINURIA REV: CPT | Mod: CPTII,S$GLB,, | Performed by: ORTHOPAEDIC SURGERY

## 2024-10-31 PROCEDURE — 1159F MED LIST DOCD IN RCRD: CPT | Mod: CPTII,S$GLB,, | Performed by: ORTHOPAEDIC SURGERY

## 2024-10-31 RX ORDER — ROSUVASTATIN CALCIUM 20 MG/1
20 TABLET, COATED ORAL DAILY
Qty: 90 TABLET | Refills: 0 | Status: SHIPPED | OUTPATIENT
Start: 2024-10-31

## 2024-10-31 RX ORDER — ROSUVASTATIN CALCIUM 20 MG/1
20 TABLET, COATED ORAL DAILY
Qty: 90 TABLET | Refills: 0 | Status: CANCELLED | OUTPATIENT
Start: 2024-10-31

## 2025-01-24 ENCOUNTER — PATIENT OUTREACH (OUTPATIENT)
Dept: ADMINISTRATIVE | Facility: HOSPITAL | Age: 55
End: 2025-01-24
Payer: COMMERCIAL

## 2025-01-24 ENCOUNTER — TELEPHONE (OUTPATIENT)
Dept: ADMINISTRATIVE | Facility: HOSPITAL | Age: 55
End: 2025-01-24
Payer: COMMERCIAL

## 2025-01-24 DIAGNOSIS — Z11.59 NEED FOR HEPATITIS C SCREENING TEST: ICD-10-CM

## 2025-01-24 DIAGNOSIS — Z79.4 TYPE 2 DIABETES MELLITUS WITH HYPERGLYCEMIA, WITH LONG-TERM CURRENT USE OF INSULIN: ICD-10-CM

## 2025-01-24 DIAGNOSIS — Z00.00 WELLNESS EXAMINATION: Primary | ICD-10-CM

## 2025-01-24 DIAGNOSIS — E11.65 TYPE 2 DIABETES MELLITUS WITH HYPERGLYCEMIA, WITH LONG-TERM CURRENT USE OF INSULIN: ICD-10-CM

## 2025-01-24 DIAGNOSIS — Z12.11 SCREEN FOR COLON CANCER: Primary | ICD-10-CM

## 2025-01-24 NOTE — PROGRESS NOTES
Population Health Chart Review & Patient Outreach Details      Additional [facility] Health Notes:               Updates Requested / Reviewed:      Updated Care Coordination Note, Care Everywhere, External Sources: [facility] and [facility], Care Team Updated, Removed  or Duplicate Orders, and Immunizations Reconciliation Completed or Queried: [facility] Topics Overdue:      [facility] Score: 1     Colon Cancer Screening                       Health Maintenance Topic(s) Outreach Outcomes & Actions Taken:    Lab(s) - Outreach Outcomes & Actions Taken  : Overdue Lab(s) Ordered and Overdue Lab(s) Scheduled    Eye Exam - Outreach Outcomes & Actions Taken  : Eye Camera Scheduled or Optometry/Ophthalmology Referral Placed/Appt Scheduled

## 2025-01-31 ENCOUNTER — HOSPITAL ENCOUNTER (EMERGENCY)
Facility: HOSPITAL | Age: 55
Discharge: HOME | End: 2025-02-01
Attending: EMERGENCY MEDICINE
Payer: COMMERCIAL

## 2025-01-31 DIAGNOSIS — F41.9 ANXIOUSNESS: Primary | ICD-10-CM

## 2025-01-31 DIAGNOSIS — R07.9 CHEST PAIN: ICD-10-CM

## 2025-01-31 LAB
ALBUMIN SERPL BCP-MCNC: 4.6 G/DL (ref 3.5–5.2)
ALP SERPL-CCNC: 74 U/L (ref 40–150)
ALT SERPL W/O P-5'-P-CCNC: 22 U/L (ref 10–44)
ANION GAP SERPL CALC-SCNC: 15 MMOL/L (ref 8–16)
AST SERPL-CCNC: 21 U/L (ref 10–40)
BASOPHILS # BLD AUTO: 0.04 K/UL (ref 0–0.2)
BASOPHILS NFR BLD: 0.4 % (ref 0–1.9)
BILIRUB SERPL-MCNC: 0.4 MG/DL (ref 0.1–1)
BNP SERPL-MCNC: <10 PG/ML (ref 0–99)
BUN SERPL-MCNC: 15 MG/DL (ref 6–20)
CALCIUM SERPL-MCNC: 9.2 MG/DL (ref 8.7–10.5)
CHLORIDE SERPL-SCNC: 104 MMOL/L (ref 95–110)
CO2 SERPL-SCNC: 21 MMOL/L (ref 23–29)
CREAT SERPL-MCNC: 1.4 MG/DL (ref 0.5–1.4)
DIFFERENTIAL METHOD BLD: ABNORMAL
EOSINOPHIL # BLD AUTO: 0.2 K/UL (ref 0–0.5)
EOSINOPHIL NFR BLD: 2.2 % (ref 0–8)
ERYTHROCYTE [DISTWIDTH] IN BLOOD BY AUTOMATED COUNT: 11.9 % (ref 11.5–14.5)
EST. GFR  (NO RACE VARIABLE): >60 ML/MIN/1.73 M^2
GLUCOSE SERPL-MCNC: 266 MG/DL (ref 70–110)
HCT VFR BLD AUTO: 47.5 % (ref 40–54)
HGB BLD-MCNC: 15.6 G/DL (ref 14–18)
IMM GRANULOCYTES # BLD AUTO: 0.04 K/UL (ref 0–0.04)
IMM GRANULOCYTES NFR BLD AUTO: 0.4 % (ref 0–0.5)
LYMPHOCYTES # BLD AUTO: 2.3 K/UL (ref 1–4.8)
LYMPHOCYTES NFR BLD: 21.1 % (ref 18–48)
MCH RBC QN AUTO: 28.7 PG (ref 27–31)
MCHC RBC AUTO-ENTMCNC: 32.8 G/DL (ref 32–36)
MCV RBC AUTO: 88 FL (ref 82–98)
MONOCYTES # BLD AUTO: 0.6 K/UL (ref 0.3–1)
MONOCYTES NFR BLD: 5.8 % (ref 4–15)
NEUTROPHILS # BLD AUTO: 7.5 K/UL (ref 1.8–7.7)
NEUTROPHILS NFR BLD: 70.1 % (ref 38–73)
NRBC BLD-RTO: 0 /100 WBC
PLATELET # BLD AUTO: 280 K/UL (ref 150–450)
PMV BLD AUTO: 8.9 FL (ref 9.2–12.9)
POTASSIUM SERPL-SCNC: 3.6 MMOL/L (ref 3.5–5.1)
PROT SERPL-MCNC: 7.7 G/DL (ref 6–8.4)
RBC # BLD AUTO: 5.43 M/UL (ref 4.6–6.2)
SODIUM SERPL-SCNC: 140 MMOL/L (ref 136–145)
TROPONIN I SERPL DL<=0.01 NG/ML-MCNC: <0.006 NG/ML (ref 0–0.03)
TROPONIN I SERPL DL<=0.01 NG/ML-MCNC: <0.006 NG/ML (ref 0–0.03)
WBC # BLD AUTO: 10.68 K/UL (ref 3.9–12.7)

## 2025-01-31 PROCEDURE — 85025 COMPLETE CBC W/AUTO DIFF WBC: CPT

## 2025-01-31 PROCEDURE — 83880 ASSAY OF NATRIURETIC PEPTIDE: CPT

## 2025-01-31 PROCEDURE — 99285 EMERGENCY DEPT VISIT HI MDM: CPT | Mod: 25

## 2025-01-31 PROCEDURE — 93005 ELECTROCARDIOGRAM TRACING: CPT

## 2025-01-31 PROCEDURE — 84484 ASSAY OF TROPONIN QUANT: CPT | Mod: 91 | Performed by: EMERGENCY MEDICINE

## 2025-01-31 PROCEDURE — 80053 COMPREHEN METABOLIC PANEL: CPT

## 2025-01-31 PROCEDURE — 93010 ELECTROCARDIOGRAM REPORT: CPT | Mod: ,,, | Performed by: INTERNAL MEDICINE

## 2025-01-31 PROCEDURE — 84484 ASSAY OF TROPONIN QUANT: CPT

## 2025-02-01 VITALS
SYSTOLIC BLOOD PRESSURE: 149 MMHG | WEIGHT: 185 LBS | HEART RATE: 89 BPM | OXYGEN SATURATION: 100 % | BODY MASS INDEX: 27.32 KG/M2 | DIASTOLIC BLOOD PRESSURE: 74 MMHG | RESPIRATION RATE: 20 BRPM | TEMPERATURE: 99 F

## 2025-02-01 NOTE — DISCHARGE INSTRUCTIONS
Please schedule follow up appointment for re-evaluation with the primary care doctor within the next 1 week.    Seek medical care if symptoms worsen or any concerns.

## 2025-02-01 NOTE — ED PROVIDER NOTES
Encounter Date: 2025    SCRIBE #1 NOTE: I, Melani Arellano, am scribing for, and in the presence of,  Gilberto Gonzalez MD. I have scribed the following portions of the note - Other sections scribed: HPI, ROS, PE.       History     Chief Complaint   Patient presents with    Anxiety     Pt gets anxious from time to time and felt a little something so checked it and it was 174/102     54-year-old male, with a PMHx of T2DM, HLD, and GERD, presents to the ED for evaluation of anterior mid-sternal chest pain that began at 1900 today. He reports associated anxiety and elevated blood pressure. He states he feels anxious from time to time. He reports he has been feeling fine the last few days. He reports he was working as a  tonight when his symptoms began but denies responding to any fires today. Reports a family history of heart disease but denies personal history of heart disease. Denies smoking or EtOH consumption.  Patient also reported some mild chest discomfort that has since resolved.    The history is provided by the patient. No  was used.     Review of patient's allergies indicates:  No Known Allergies  Past Medical History:   Diagnosis Date    Asthma     as a child    Obesity     ST segment depression     Tobacco use     Type 2 diabetes mellitus without complication, without long-term current use of insulin      Past Surgical History:   Procedure Laterality Date    ROTATOR CUFF REPAIR Right      Family History   Problem Relation Name Age of Onset    Diabetes type II Father      Heart attack Brother       Social History     Tobacco Use    Smoking status: Former     Current packs/day: 0.00     Types: Cigarettes     Quit date:      Years since quittin.0    Smokeless tobacco: Never   Substance Use Topics    Alcohol use: No    Drug use: No     Review of Systems   Constitutional:  Negative for chills and fever.   HENT:  Negative for congestion and sore throat.    Respiratory:   Negative for cough and shortness of breath.    Cardiovascular:  Positive for chest pain.   Gastrointestinal:  Negative for abdominal pain and nausea.   Musculoskeletal:  Negative for back pain.   Skin:  Negative for rash.   Neurological:  Negative for headaches.   Psychiatric/Behavioral:  The patient is nervous/anxious.        Physical Exam     Initial Vitals [01/31/25 2104]   BP Pulse Resp Temp SpO2   (!) 182/89 110 14 98 °F (36.7 °C) 99 %      MAP       --         Physical Exam    Nursing note and vitals reviewed.  Constitutional: He appears well-developed. He is not diaphoretic. No distress.   HENT:   Head: Normocephalic.   Eyes: EOM are normal.   Cardiovascular:  Normal rate and regular rhythm.           No murmur heard.  Pulmonary/Chest: Effort normal and breath sounds normal. He has no wheezes. He has no rhonchi. He has no rales.   Abdominal: Abdomen is soft. He exhibits no distension. There is no abdominal tenderness.   Musculoskeletal:         General: Normal range of motion.      Comments: No lower extremity edema or calf asymmetry.     Neurological: He is alert.   Skin: Skin is warm.         ED Course   Procedures  Labs Reviewed   CBC W/ AUTO DIFFERENTIAL - Abnormal       Result Value    WBC 10.68      RBC 5.43      Hemoglobin 15.6      Hematocrit 47.5      MCV 88      MCH 28.7      MCHC 32.8      RDW 11.9      Platelets 280      MPV 8.9 (*)     Immature Granulocytes 0.4      Gran # (ANC) 7.5      Immature Grans (Abs) 0.04      Lymph # 2.3      Mono # 0.6      Eos # 0.2      Baso # 0.04      nRBC 0      Gran % 70.1      Lymph % 21.1      Mono % 5.8      Eosinophil % 2.2      Basophil % 0.4      Differential Method Automated     COMPREHENSIVE METABOLIC PANEL - Abnormal    Sodium 140      Potassium 3.6      Chloride 104      CO2 21 (*)     Glucose 266 (*)     BUN 15      Creatinine 1.4      Calcium 9.2      Total Protein 7.7      Albumin 4.6      Total Bilirubin 0.4      Alkaline Phosphatase 74      AST 21       ALT 22      eGFR >60      Anion Gap 15     TROPONIN I    Troponin I <0.006     B-TYPE NATRIURETIC PEPTIDE    BNP <10     TROPONIN I    Troponin I <0.006            Imaging Results              X-Ray Chest AP Portable (Final result)  Result time 01/31/25 23:29:11      Final result by Nahed Delacruz MD (01/31/25 23:29:11)                   Impression:      No acute intrathoracic abnormality detected.      Electronically signed by: Nahed Delacruz  Date:    01/31/2025  Time:    23:29               Narrative:    EXAMINATION:  AP PORTABLE CHEST    CLINICAL HISTORY:  Chest pain, unspecified    TECHNIQUE:  AP portable chest radiograph was submitted.    COMPARISON:  05/05/2018    FINDINGS:  AP portable chest radiograph demonstrates a cardiac silhouette within normal limits.  There is no focal consolidation, pneumothorax, or pleural effusion.                                       Medications - No data to display  Medical Decision Making    54-year-old male presenting with sensation of anxiety.  The patient reports he works as a .  No recent fires worked today.  The patient did report some mild chest discomfort that has since resolved.  The patient is overall well-appearing at this time.  No ST changes noted on ECG.  No elevation on initial or delta troponin.  All symptoms resolved.  Patient is overall well-appearing.  I discussed the labs and imaging with the patient.  The patient understands that he should follow up with the primary care provider for re-evaluation within the next one week.        Medical Decision Making:     A. Problem List:  1. Chest pain  2. Anxiousness     B. Differential diagnosis:    ACS, anxiety, lower respiratory tract infection, cardiomyopathy,      ECG:  Please check workup area for ECG interpretation.    Part of the note was done using electronic dictation services.          Amount and/or Complexity of Data Reviewed  Labs:  Decision-making details documented in ED  Course.  Radiology: ordered.  ECG/medicine tests:  Decision-making details documented in ED Course.      Additional MDM:   Heart Score:    History:          Slightly suspicious.  ECG:             Normal           Scribe Attestation:   Scribe #1: I performed the above scribed service and the documentation accurately describes the services I performed. I attest to the accuracy of the note.        ED Course as of 02/01/25 1937 Fri Jan 31, 2025 2213 Troponin I: <0.006 [JM]   2325 The patient continues to feel well.  Was offered medicine however he states he is on shift. [JM]   Sat Feb 01, 2025   0001 Troponin I: <0.006 [JM]   1936 EKG 12-lead  Time 9:14 p.m.     Rate 108, sinus, regular rhythm, normal axis.   QRS 80 .  No ST elevation or depression no T-wave inversion no hyperacute T-waves.  No Q-waves present.    Sinus tachycardia. [JM]      ED Course User Index  [JM] Gilberto Gonzalez MD                         I, Gilberto Gonzalez, personally performed the services described in this documentation. All medical record entries made by the scribe were at my direction and in my presence. I have reviewed the chart and agree that the record reflects my personal performance and is accurate and complete.      DISCLAIMER: This note was prepared with [employer] voice recognition transcription software. Garbled syntax, mangled pronouns, and other bizarre constructions may be attributed to that software system.     Clinical Impression:  Final diagnoses:  [R07.9] Chest pain  [F41.9] Anxiousness (Primary)          ED Disposition Condition    Discharge Stable          ED Prescriptions    None       Follow-up Information       Follow up With Specialties Details Why Contact Info    Juanjose Koo MD Family Medicine Schedule an appointment as soon as possible for a visit in 1 week Primary care [address] [facility] [address] [place] [address]  [phone]      [facility]  Schedule an appointment as soon as possible  for a visit in 1 week Primary care [address] [id number] [phone]    [facility] Emergency Dept Emergency Medicine  If symptoms worsen [address] [facility] [place] [id number] [phone]             Gilberto Gonzalez MD  02/01/25 1937

## 2025-02-01 NOTE — ED TRIAGE NOTES
Pt presents to the ED with complaints of non radiating mid sternal chest pain that started at 7 pm tonight. Pt also states taking Blood pressure and it was elevated at work. Pt denies SOB or any other pain.   Chonodrocutaneous Helical Advancement Flap Text: The defect edges were debeveled with a #15 scalpel blade.  Given the location of the defect, exposed cartilage, and the proximity to free margins a chondrocutaneous helical advancement flap was deemed most appropriate.  Using a sterile surgical marker, the appropriate advancement flap was drawn incorporating the defect and placing the expected incisions within the relaxed skin tension lines where possible.  The area thus outlined was incised deep to adipose tissue with a #15 scalpel blade.  The skin margins were undermined to an appropriate distance in all directions utilizing.

## 2025-02-02 LAB
OHS QRS DURATION: 80 MS
OHS QTC CALCULATION: 463 MS

## 2025-02-10 ENCOUNTER — OFFICE VISIT (OUTPATIENT)
Dept: OPTOMETRY | Facility: CLINIC | Age: 55
End: 2025-02-10
Payer: COMMERCIAL

## 2025-02-10 DIAGNOSIS — H52.4 BILATERAL PRESBYOPIA: ICD-10-CM

## 2025-02-10 DIAGNOSIS — Z00.00 WELLNESS EXAMINATION: ICD-10-CM

## 2025-02-10 DIAGNOSIS — E11.9 TYPE 2 DIABETES MELLITUS WITHOUT RETINOPATHY: Primary | ICD-10-CM

## 2025-02-10 PROCEDURE — 99999 PR PBB SHADOW E&M-EST. PATIENT-LVL III: CPT | Mod: PBBFAC,,, | Performed by: OPTOMETRIST

## 2025-02-10 PROCEDURE — 1160F RVW MEDS BY RX/DR IN RCRD: CPT | Mod: CPTII,S$GLB,, | Performed by: OPTOMETRIST

## 2025-02-10 PROCEDURE — 1159F MED LIST DOCD IN RCRD: CPT | Mod: CPTII,S$GLB,, | Performed by: OPTOMETRIST

## 2025-02-10 PROCEDURE — 92004 COMPRE OPH EXAM NEW PT 1/>: CPT | Mod: S$GLB,,, | Performed by: OPTOMETRIST

## 2025-02-10 PROCEDURE — 2023F DILAT RTA XM W/O RTNOPTHY: CPT | Mod: CPTII,S$GLB,, | Performed by: OPTOMETRIST

## 2025-02-10 NOTE — PROGRESS NOTES
HPI    Pt states : here for annual dme .ldme +2 years ago elsewhere was not   dilated did optos   Pt feels vision is stable w/o specs , hs is a little hard ,needs more   light to see // only wears otc readers for near +1.50  No gtts used , OU are a little itchy   No hsty of eye disease , trauma or surgery   Denies F/F   Hemoglobin A1C       Date                     Value               Ref Range             Status                04/09/2024               9.0 (H)             4.0 - 5.6 %           Final                Last edited by Smith Espinoza on 2/10/2025  8:16 AM.            Assessment /Plan     For exam results, see Encounter Report.    Type 2 diabetes mellitus without retinopathy    Wellness examination  -     Ambulatory referral/consult to Optometry    Bilateral presbyopia      No diabetic retinopathy, no csme. Return in 1 year for dilated eye exam.  3. Cont with OTC readers. +1.50

## 2025-03-03 DIAGNOSIS — E11.65 TYPE 2 DIABETES MELLITUS WITH HYPERGLYCEMIA, WITH LONG-TERM CURRENT USE OF INSULIN: ICD-10-CM

## 2025-03-03 DIAGNOSIS — Z79.4 TYPE 2 DIABETES MELLITUS WITH HYPERGLYCEMIA, WITH LONG-TERM CURRENT USE OF INSULIN: ICD-10-CM

## 2025-03-03 RX ORDER — ROSUVASTATIN CALCIUM 20 MG/1
20 TABLET, COATED ORAL DAILY
Qty: 90 TABLET | Refills: 0 | OUTPATIENT
Start: 2025-03-03

## 2025-05-01 ENCOUNTER — TELEPHONE (OUTPATIENT)
Dept: PHARMACY | Facility: CLINIC | Age: 55
End: 2025-05-01
Payer: COMMERCIAL

## 2025-05-01 NOTE — TELEPHONE ENCOUNTER
[facility] Chart Review & Patient Outreach Details For Medication Adherence Project    Reason for Outreach Encounter: 3rd Party payor non-compliance report ([employer], BCBS, C, etc)  2.  Patient Outreach Method: [employer] message  3.   Medication in question: rosuvastatin   LAST FILLED: 10/31/24 for 90 day supply  Hyperlipidemia Medications              rosuvastatin (CRESTOR) 20 MG tablet Take 1 tablet (20 mg total) by mouth once daily.               4.  Reviewed and or Updates Made To: Patient Chart  5. Outreach Outcomes and/or actions taken: Sent inquiry to patient: Waiting for response.

## 2025-05-28 ENCOUNTER — PATIENT OUTREACH (OUTPATIENT)
Dept: ADMINISTRATIVE | Facility: HOSPITAL | Age: 55
End: 2025-05-28
Payer: COMMERCIAL

## 2025-05-31 ENCOUNTER — TELEPHONE (OUTPATIENT)
Dept: PHARMACY | Facility: CLINIC | Age: 55
End: 2025-05-31
Payer: COMMERCIAL

## 2025-06-01 NOTE — TELEPHONE ENCOUNTER
[facility] Chart Review & Patient Outreach Details For Medication Adherence Project    Reason for Outreach Encounter: 3rd Party payor non-compliance report ([employer], BCBS, C, etc)  2.  Patient Outreach Method: [employer] message  3.   Medication in question: rosuvastatin    LAST FILLED: 10/31/24 for 90 day supply  Hyperlipidemia Medications              rosuvastatin (CRESTOR) 20 MG tablet Take 1 tablet (20 mg total) by mouth once daily.               4.  Reviewed and or Updates Made To: Patient Chart  5. Outreach Outcomes and/or actions taken: Sent inquiry to patient: Waiting for response.

## 2025-06-26 ENCOUNTER — TELEPHONE (OUTPATIENT)
Dept: ENDOCRINOLOGY | Facility: CLINIC | Age: 55
End: 2025-06-26
Payer: COMMERCIAL

## 2025-06-26 ENCOUNTER — TELEPHONE (OUTPATIENT)
Dept: FAMILY MEDICINE | Facility: CLINIC | Age: 55
End: 2025-06-26
Payer: COMMERCIAL

## 2025-06-26 NOTE — TELEPHONE ENCOUNTER
Copied from CRM #[id number]. Topic: General Inquiry - Patient Advice  >> Jun 26, 2025 10:29 AM Geri wrote:  Type: Needs Medical Advice  Who Called:  Patient  Best Call Back Number: [phone]    Additional Information: Pt is scheduled for a check up on 07/18 and was wanting to see if Susan Barahona NP can order him labs to have done before his appt. Can we please check on this and call pt back to confirm. Thank You

## 2025-06-26 NOTE — TELEPHONE ENCOUNTER
Copied from CRM #[id number]. Topic: Appointments - Appointment Scheduling  >> Jun 26, 2025 10:21 AM Geri wrote:  Type:  Sooner Appointment Request    Name of Caller:  Pts wife Alida  When is the first available appointment?  N/A  Symptoms:  Needs check up for refills  Would the patient rather a call back or a response via [facility]? Call  Best Call Back Number:  [phone]   Additional Information:  Pt is a  and he will be off on 07/09 and wanted to see if we can work him in that day. It would not allow me to schedule for that day. Can we please check on this and call Mrs Irwin back to assist. Thank You

## 2025-06-27 ENCOUNTER — TELEPHONE (OUTPATIENT)
Dept: ENDOCRINOLOGY | Facility: CLINIC | Age: 55
End: 2025-06-27
Payer: COMMERCIAL

## 2025-06-27 ENCOUNTER — TELEPHONE (OUTPATIENT)
Dept: FAMILY MEDICINE | Facility: CLINIC | Age: 55
End: 2025-06-27
Payer: COMMERCIAL

## 2025-06-27 NOTE — TELEPHONE ENCOUNTER
Copied from CRM #[id number]. Topic: General Inquiry - Return Call  >> Jun 27, 2025  9:51 AM Lenka wrote:  Type:  Patient Returning Call    Who Called:  Wife, Alida  Who Left Message for Patient:  Nica  Does the patient know what this is regarding?:    7/9 at 11:00 AM appointment    Would the patient rather a call back or a response via [facility]?   Call back  Best Call Back Number:   [phone] - Alida    Additional Information:   States she is returning a missed call - states they would like that appointment if still available - please call - thank you

## 2025-06-27 NOTE — TELEPHONE ENCOUNTER
Copied from CRM #[id number]. Topic: General Inquiry - Patient Advice  >> Jun 27, 2025  9:54 AM Lenka wrote:  Type: Orders Request    What orders/ testing are being requested?  Labs    Is there a future appointment scheduled for the patient with PCP?  Yes - Susan Barahona    When?  7/18    Would you prefer a response via [facility]?  Call back   [phone] - Alida    Comments:  States they are requesting orders for labs be put into the system so they can be scheduled prior to his appointment on 7/18 - please call - thank you

## 2025-07-07 ENCOUNTER — PATIENT MESSAGE (OUTPATIENT)
Dept: ENDOCRINOLOGY | Facility: CLINIC | Age: 55
End: 2025-07-07
Payer: COMMERCIAL

## 2025-07-08 ENCOUNTER — TELEPHONE (OUTPATIENT)
Dept: PHARMACY | Facility: CLINIC | Age: 55
End: 2025-07-08
Payer: COMMERCIAL

## 2025-07-08 NOTE — TELEPHONE ENCOUNTER
[facility] Chart Review & Patient Outreach Details For Medication Adherence Project    Reason for Outreach Encounter: 3rd Party payor non-compliance report ([employer], BCBS, UHC, etc)  2.  Patient Outreach Method: Reviewed patient chart   3.   Medication in question:    Hyperlipidemia Medications              rosuvastatin (CRESTOR) 20 MG tablet Take 1 tablet (20 mg total) by mouth once daily.                  rosuvastatin   last filled  10/31/24 for 90 day supply    4.  Reviewed and or Updates Made To: Patient Chart  5. Outreach Outcomes and/or actions taken: Sent inquiry to patient: Waiting for response  Additional Notes:

## 2025-07-09 ENCOUNTER — OFFICE VISIT (OUTPATIENT)
Dept: FAMILY MEDICINE | Facility: CLINIC | Age: 55
End: 2025-07-09
Payer: COMMERCIAL

## 2025-07-09 VITALS
SYSTOLIC BLOOD PRESSURE: 132 MMHG | OXYGEN SATURATION: 97 % | HEIGHT: 69 IN | WEIGHT: 196.63 LBS | DIASTOLIC BLOOD PRESSURE: 80 MMHG | RESPIRATION RATE: 18 BRPM | HEART RATE: 94 BPM | BODY MASS INDEX: 29.12 KG/M2

## 2025-07-09 DIAGNOSIS — E66.3 OVERWEIGHT (BMI 25.0-29.9): ICD-10-CM

## 2025-07-09 DIAGNOSIS — Z79.4 TYPE 2 DIABETES MELLITUS WITH HYPERGLYCEMIA, WITH LONG-TERM CURRENT USE OF INSULIN: ICD-10-CM

## 2025-07-09 DIAGNOSIS — E78.49 OTHER HYPERLIPIDEMIA: ICD-10-CM

## 2025-07-09 DIAGNOSIS — Z00.00 WELLNESS EXAMINATION: Primary | ICD-10-CM

## 2025-07-09 DIAGNOSIS — K21.9 GERD WITHOUT ESOPHAGITIS: ICD-10-CM

## 2025-07-09 DIAGNOSIS — E11.65 TYPE 2 DIABETES MELLITUS WITH HYPERGLYCEMIA, WITH LONG-TERM CURRENT USE OF INSULIN: ICD-10-CM

## 2025-07-09 PROCEDURE — 1159F MED LIST DOCD IN RCRD: CPT | Mod: CPTII,S$GLB,, | Performed by: FAMILY MEDICINE

## 2025-07-09 PROCEDURE — 99396 PREV VISIT EST AGE 40-64: CPT | Mod: S$GLB,,, | Performed by: FAMILY MEDICINE

## 2025-07-09 PROCEDURE — 3075F SYST BP GE 130 - 139MM HG: CPT | Mod: CPTII,S$GLB,, | Performed by: FAMILY MEDICINE

## 2025-07-09 PROCEDURE — 3079F DIAST BP 80-89 MM HG: CPT | Mod: CPTII,S$GLB,, | Performed by: FAMILY MEDICINE

## 2025-07-09 PROCEDURE — 1160F RVW MEDS BY RX/DR IN RCRD: CPT | Mod: CPTII,S$GLB,, | Performed by: FAMILY MEDICINE

## 2025-07-09 PROCEDURE — 99999 PR PBB SHADOW E&M-EST. PATIENT-LVL IV: CPT | Mod: PBBFAC,,, | Performed by: FAMILY MEDICINE

## 2025-07-09 PROCEDURE — 3008F BODY MASS INDEX DOCD: CPT | Mod: CPTII,S$GLB,, | Performed by: FAMILY MEDICINE

## 2025-07-09 NOTE — PROGRESS NOTES
Patient ID: Roni Keen is a 54 y.o. male.    Chief Complaint: Annual Exam (Wellness exam med refills), Medication Refill, and Diabetes    History of Present Illness    CHIEF COMPLAINT:  Patient presents today for annual exam.    TYPE 2 DIABETES:  54-year-old  with known Type 2 Diabetes. He has labs ordered from endocrinology for ongoing diabetes management and denies any acute diabetes-related symptoms at this time.    GERD:  He reports ongoing gastroesophageal reflux symptoms that recur if proton pump inhibitor medication is not taken. He denies solid food dysphagia, noting that food passes without difficulty. Symptoms are directly linked to medication adherence, with symptoms returning when proton pump inhibitor is not consumed.    HYPERLIPIDEMIA:  He has known hyperlipidemia and is currently not taking a statin medication despite recommendation. No active symptoms reported related to hyperlipidemia. His BMI is 29.0.    PSA HISTORY:  He reports a history of elevated PSA in the past, which was subsequently investigated and determined to be benign. No current concerns regarding prostate health were expressed.    VACCINATION STATUS:  He is not interested in receiving the COVID vaccine. He has not yet received recommended Prevnar 20 pneumococcal vaccine, shingles vaccine, and tetanus booster.      ROS:  General: -fever, -chills, -fatigue, -weight gain, -weight loss  Eyes: -vision changes, -redness, -discharge  ENT: -ear pain, -nasal congestion, -sore throat  Cardiovascular: -chest pain, -palpitations, -lower extremity edema  Respiratory: -cough, -shortness of breath  Gastrointestinal: -abdominal pain, -nausea, -vomiting, -diarrhea, -constipation, -blood in stool, +indigestion  Genitourinary: -dysuria, -hematuria, -frequency  Musculoskeletal: -joint pain, -muscle pain  Skin: -rash, -lesion  Neurological: -headache, -dizziness, -numbness, -tingling  Psychiatric: -anxiety, -depression, -sleep difficulty          Physical Exam    Vitals: BMI: 29.0. Overweight.  General: No acute distress. Well-developed. Well-nourished.  Eyes: EOMI. Sclerae anicteric.  HENT: Normocephalic. Atraumatic. Nares patent. Moist oral mucosa.  Ears: Bilateral TMs clear. Bilateral EACs clear.  Cardiovascular: Regular rate. Regular rhythm. No murmurs. No rubs. No gallops. Normal S1, S2.  Respiratory: Normal respiratory effort. Clear to auscultation bilaterally. No rales. No rhonchi. No wheezing.  Abdomen: Soft. Non-tender. Non-distended. Normoactive bowel sounds.  Musculoskeletal: No  obvious deformity.  Extremities: No lower extremity edema.  Neurological: Alert & oriented x3. No slurred speech. Normal gait.  Psychiatric: Normal mood. Normal affect. Good insight. Good judgment.  Skin: Warm. Dry. No rash.         Assessment & Plan    E11.9 Type 2 diabetes mellitus without complications  K21.9 Gastro-esophageal reflux disease without esophagitis  E78.5 Hyperlipidemia, unspecified  E66.3 Overweight  R97.20 Elevated prostate specific antigen [PSA]  Z28.310 Unvaccinated for COVID-19    TYPE 2 DIABETES MELLITUS:  - Ordered A1C test and microalbumin creatinine ratio tests as requested by endocrinology.  - Patient qualifies for Mounjaro (tirzepatide) for both weight loss and blood sugar control.  - Recommend starting at 2.5 mg weekly injection, with potential dose increases to 5, 10, and 15 mg weekly.  - Advised patient to discuss this medication with endocrinology.  - Assessed need for diabetic foot exam.  - Discussed statin recommendation due to diabetes, though patient is not currently taking it.    GASTROESOPHAGEAL REFLUX DISEASE:  - Patient experiences reflux symptoms when not taking proton pump inhibitor.  - No solid food dysphagia reported, and food is going down appropriately.  - Continued current proton pump inhibitor therapy for management.    HYPERLIPIDEMIA:  - Considered statin therapy in context of diabetes; deferred discussion to  endocrinology who will address this with the patient.    OVERWEIGHT:  - Patient's BMI is 29.0, qualifying for Mounjaro (tirzepatide) therapy for weight loss, as noted in diabetes plan.    ELEVATED PSA:  - Patient had previous PSA elevation that was worked up and found to be benign.  - Added PSA to endocrinology-ordered labs for continued monitoring.    VACCINATION STATUS:  - Patient is not amenable to receiving COVID-19 vaccine.  - Informed about other recommended vaccinations including Prevnar 20 pneumococcal vaccine, shingles vaccine, and tetanus booster.    FOLLOW-UP:  - Scheduled follow up in 1 year for annual exam, as the patient is being monitored by endocrinology.            No follow-ups on file.    This note was generated with the assistance of ambient listening technology. Verbal consent was obtained by the patient and accompanying visitor(s) for the recording of patient appointment to facilitate this note. I attest to having reviewed and edited the generated note for accuracy, though some syntax or spelling errors may persist. Please contact the author of this note for any clarification.

## 2025-07-09 NOTE — LETTER
July 11, 2025        Roni WANPatrick Keen  [address] [place] [address]             [facility]  [address] [place] [address]  Phone: [phone]  Fax: [phone]   Patient: Roni Keen   MR Number: [id number]   YOB: 1970   Date of Visit: 7/9/2025     Dear Dr. Keen,     Thank you for your visit.    Sincerely,      Juanjose Koo MD            CC  No Recipients    Enclosure

## 2025-07-13 ENCOUNTER — PATIENT MESSAGE (OUTPATIENT)
Dept: ADMINISTRATIVE | Facility: HOSPITAL | Age: 55
End: 2025-07-13
Payer: COMMERCIAL

## 2025-07-14 ENCOUNTER — PATIENT MESSAGE (OUTPATIENT)
Dept: ADMINISTRATIVE | Facility: HOSPITAL | Age: 55
End: 2025-07-14
Payer: COMMERCIAL

## 2025-07-16 ENCOUNTER — LAB VISIT (OUTPATIENT)
Dept: LAB | Facility: HOSPITAL | Age: 55
End: 2025-07-16
Attending: NURSE PRACTITIONER
Payer: COMMERCIAL

## 2025-07-16 DIAGNOSIS — E11.9 TYPE 2 DIABETES MELLITUS WITHOUT COMPLICATION, WITHOUT LONG-TERM CURRENT USE OF INSULIN: ICD-10-CM

## 2025-07-16 DIAGNOSIS — K21.9 GERD WITHOUT ESOPHAGITIS: ICD-10-CM

## 2025-07-16 DIAGNOSIS — E11.65 TYPE 2 DIABETES MELLITUS WITH HYPERGLYCEMIA, WITH LONG-TERM CURRENT USE OF INSULIN: ICD-10-CM

## 2025-07-16 DIAGNOSIS — Z79.4 TYPE 2 DIABETES MELLITUS WITH HYPERGLYCEMIA, WITH LONG-TERM CURRENT USE OF INSULIN: ICD-10-CM

## 2025-07-16 DIAGNOSIS — Z00.00 WELLNESS EXAMINATION: ICD-10-CM

## 2025-07-16 LAB
ALBUMIN SERPL BCP-MCNC: 4.5 G/DL (ref 3.5–5.2)
ALP SERPL-CCNC: 84 UNIT/L (ref 40–150)
ALT SERPL W/O P-5'-P-CCNC: 22 UNIT/L (ref 10–44)
ANION GAP (OHS): 9 MMOL/L (ref 8–16)
AST SERPL-CCNC: 15 UNIT/L (ref 11–45)
BILIRUB SERPL-MCNC: 0.4 MG/DL (ref 0.1–1)
BUN SERPL-MCNC: 15 MG/DL (ref 6–20)
CALCIUM SERPL-MCNC: 9.3 MG/DL (ref 8.7–10.5)
CHLORIDE SERPL-SCNC: 107 MMOL/L (ref 95–110)
CHOLEST SERPL-MCNC: 249 MG/DL (ref 120–199)
CHOLEST/HDLC SERPL: 6.4 {RATIO} (ref 2–5)
CO2 SERPL-SCNC: 26 MMOL/L (ref 23–29)
CREAT SERPL-MCNC: 1 MG/DL (ref 0.5–1.4)
EAG (OHS): 194 MG/DL (ref 68–131)
GFR SERPLBLD CREATININE-BSD FMLA CKD-EPI: >60 ML/MIN/1.73/M2
GLUCOSE SERPL-MCNC: 187 MG/DL (ref 70–110)
HBA1C MFR BLD: 8.4 % (ref 4–5.6)
HDLC SERPL-MCNC: 39 MG/DL (ref 40–75)
HDLC SERPL: 15.7 % (ref 20–50)
LDLC SERPL CALC-MCNC: 165.8 MG/DL (ref 63–159)
NONHDLC SERPL-MCNC: 210 MG/DL
POTASSIUM SERPL-SCNC: 4.7 MMOL/L (ref 3.5–5.1)
PROT SERPL-MCNC: 7.4 GM/DL (ref 6–8.4)
PSA SERPL-MCNC: 3.12 NG/ML
SODIUM SERPL-SCNC: 142 MMOL/L (ref 136–145)
TRIGL SERPL-MCNC: 221 MG/DL (ref 30–150)
TSH SERPL-ACNC: 1.95 UIU/ML (ref 0.4–4)

## 2025-07-16 PROCEDURE — 82040 ASSAY OF SERUM ALBUMIN: CPT

## 2025-07-16 PROCEDURE — 36415 COLL VENOUS BLD VENIPUNCTURE: CPT | Mod: PO

## 2025-07-16 PROCEDURE — 84153 ASSAY OF PSA TOTAL: CPT

## 2025-07-16 PROCEDURE — 84443 ASSAY THYROID STIM HORMONE: CPT

## 2025-07-16 PROCEDURE — 83036 HEMOGLOBIN GLYCOSYLATED A1C: CPT

## 2025-07-16 PROCEDURE — 82465 ASSAY BLD/SERUM CHOLESTEROL: CPT

## 2025-07-16 RX ORDER — METFORMIN HYDROCHLORIDE 500 MG/1
1000 TABLET, EXTENDED RELEASE ORAL 2 TIMES DAILY
Qty: 360 TABLET | Refills: 3 | Status: CANCELLED | OUTPATIENT
Start: 2025-07-16

## 2025-07-16 RX ORDER — OMEPRAZOLE 40 MG/1
40 CAPSULE, DELAYED RELEASE ORAL DAILY
Qty: 90 CAPSULE | Refills: 3 | Status: SHIPPED | OUTPATIENT
Start: 2025-07-16

## 2025-07-16 NOTE — TELEPHONE ENCOUNTER
No care due was identified.  [facility] Embedded Care Due Messages. Reference number: [id number].   7/16/2025 8:15:20 AM CDT

## 2025-07-16 NOTE — TELEPHONE ENCOUNTER
Refill Decision Note   Roni Keen  is requesting a refill authorization.  Brief Assessment and Rationale for Refill:  Approve     Medication Therapy Plan:        Comments:     Note composed:11:25 AM 07/16/2025

## 2025-07-17 RX ORDER — METFORMIN HYDROCHLORIDE 500 MG/1
1000 TABLET, EXTENDED RELEASE ORAL 2 TIMES DAILY
Qty: 360 TABLET | Refills: 0 | Status: SHIPPED | OUTPATIENT
Start: 2025-07-17

## 2025-07-18 ENCOUNTER — OFFICE VISIT (OUTPATIENT)
Dept: ENDOCRINOLOGY | Facility: CLINIC | Age: 55
End: 2025-07-18
Payer: COMMERCIAL

## 2025-07-18 VITALS
HEIGHT: 69 IN | HEART RATE: 78 BPM | SYSTOLIC BLOOD PRESSURE: 132 MMHG | BODY MASS INDEX: 29.05 KG/M2 | DIASTOLIC BLOOD PRESSURE: 80 MMHG | WEIGHT: 196.13 LBS

## 2025-07-18 DIAGNOSIS — E11.9 TYPE 2 DIABETES MELLITUS WITHOUT COMPLICATION, WITHOUT LONG-TERM CURRENT USE OF INSULIN: Primary | ICD-10-CM

## 2025-07-18 DIAGNOSIS — E78.2 MIXED HYPERLIPIDEMIA: ICD-10-CM

## 2025-07-18 PROCEDURE — 99999 PR PBB SHADOW E&M-EST. PATIENT-LVL III: CPT | Mod: PBBFAC,,, | Performed by: NURSE PRACTITIONER

## 2025-07-18 RX ORDER — TIRZEPATIDE 2.5 MG/.5ML
2.5 INJECTION, SOLUTION SUBCUTANEOUS
Qty: 4 PEN | Refills: 0 | Status: SHIPPED | OUTPATIENT
Start: 2025-07-18

## 2025-07-18 RX ORDER — ROSUVASTATIN CALCIUM 10 MG/1
10 TABLET, COATED ORAL DAILY
Qty: 90 TABLET | Refills: 3 | Status: SHIPPED | OUTPATIENT
Start: 2025-07-18 | End: 2026-07-18

## 2025-07-18 RX ORDER — TIRZEPATIDE 5 MG/.5ML
5 INJECTION, SOLUTION SUBCUTANEOUS
Qty: 4 PEN | Refills: 3 | Status: SHIPPED | OUTPATIENT
Start: 2025-08-18

## 2025-07-18 NOTE — PROGRESS NOTES
Subjective:       Patient ID: Roni Keen is a 55 y.o. male.    Chief Complaint: No chief complaint on file.    HPI  Pt is a 55 y.o. wm  with a diagnosis of Type 2 diabetes mellitus , as well as chronic conditions pending review including HLP .  Other pertinent medical and social information noted includes, but not limited to: Works on [employer]. .     Interim Events: July 18, 2025:  Not seen in > year. Was not able to tolerate jardiance as the polyuria was really impeding his life and especially with frequent driving.  Off Ozempic for a couple of months--but didn't feel it was beneficial. But he also just randomly checks glucose every several days.  Also been out of rosuvastatin for several months as well.     Current DM meds:   metformin 1000 mg xr bid, ozempic 1mg, jardiance 10 mg        Failed DM meds:  jardicance-polyuria, ozempic-perceived little benefit.        Back up plan for insulin pump hiatus:   Statin: rosuvastatin 20 mg        Not tolerated statin : na   ACE/ARB:none        Not tolerated ACE/ARB: na   Known Diabetic complications: none         Passwords for [employer]: na     April 9, 2024:  Was out of Ozempic for several months. But just restarted at 0.5 mg.  No focal complaints. No hypoglycemia.   Checks glucoses randomly--usually b/w 140-200, 170 today fasting.       Nov 11, 2022: Pt is new to me. Seen prior by Dr. Sandifer.  Out of meds and test strips.  Thinks he is doing well.  Was checking glcuoses about 1x day at random times and generally running about 120-140. No focal complaints.        Review of Systems   Constitutional:  Negative for appetite change and unexpected weight change.   HENT:  Negative for hearing loss and trouble swallowing.    Eyes:  Negative for photophobia and visual disturbance.   Respiratory:  Negative for cough and shortness of breath.    Cardiovascular:  Negative for chest pain and leg swelling.   Gastrointestinal:  Negative for constipation and diarrhea.  "  Genitourinary:  Positive for frequency and urgency. Negative for difficulty urinating. Genital sores: every 2 hours and several times a night.  Musculoskeletal:  Negative for arthralgias and myalgias.   Neurological:  Negative for weakness and numbness.   Psychiatric/Behavioral:  Negative for sleep disturbance. The patient is not nervous/anxious.          Objective:      Physical Exam  Constitutional:       Appearance: Normal appearance. He is normal weight.   HENT:      Head: Normocephalic and atraumatic.      Nose: Nose normal.      Mouth/Throat:      Mouth: Mucous membranes are moist.      Pharynx: Oropharynx is clear.   Eyes:      Extraocular Movements: Extraocular movements intact.      Conjunctiva/sclera: Conjunctivae normal.      Pupils: Pupils are equal, round, and reactive to light.   Neck:      Vascular: No carotid bruit.   Cardiovascular:      Rate and Rhythm: Normal rate and regular rhythm.      Pulses: Normal pulses.      Heart sounds: Normal heart sounds.   Pulmonary:      Effort: Pulmonary effort is normal.      Breath sounds: Normal breath sounds.   Musculoskeletal:         General: Normal range of motion.      Cervical back: Normal range of motion and neck supple.      Right lower leg: No edema.      Left lower leg: No edema.      Comments: Feet: no open wounds or calluses.  Good pedal care.   Pedal pulses +2 bilaterally,  sensation intact via monofilament 5/5 bilaterally    Lymphadenopathy:      Cervical: No cervical adenopathy.   Skin:     General: Skin is warm and dry.   Neurological:      General: No focal deficit present.      Mental Status: He is alert and oriented to person, place, and time.   Psychiatric:         Mood and Affect: Mood normal.         Behavior: Behavior normal.         Thought Content: Thought content normal.         Judgment: Judgment normal.           /80   Pulse 78   Ht 5' 9" (1.753 m)   Wt 89 kg (196 lb 1.6 oz)   BMI 28.96 kg/m²     Hemoglobin A1C   Date Value " Ref Range Status   04/09/2024 9.0 (H) 4.0 - 5.6 % Final     Comment:     ADA Screening Guidelines:  5.7-6.4%  Consistent with prediabetes  >or=6.5%  Consistent with diabetes    High levels of fetal hemoglobin interfere with the HbA1C  assay. Heterozygous hemoglobin variants (HbS, HgC, etc)do  not significantly interfere with this assay.   However, presence of multiple variants may affect accuracy.     11/11/2022 6.7 (H) 4.0 - 5.6 % Final     Comment:     ADA Screening Guidelines:  5.7-6.4%  Consistent with prediabetes  >or=6.5%  Consistent with diabetes    High levels of fetal hemoglobin interfere with the HbA1C  assay. Heterozygous hemoglobin variants (HbS, HgC, etc)do  not significantly interfere with this assay.   However, presence of multiple variants may affect accuracy.     03/02/2022 10.2 (H) 4.0 - 5.6 % Final     Comment:     ADA Screening Guidelines:  5.7-6.4%  Consistent with prediabetes  >or=6.5%  Consistent with diabetes    High levels of fetal hemoglobin interfere with the HbA1C  assay. Heterozygous hemoglobin variants (HbS, HgC, etc)do  not significantly interfere with this assay.   However, presence of multiple variants may affect accuracy.       Hemoglobin A1c   Date Value Ref Range Status   07/16/2025 8.4 (H) 4.0 - 5.6 % Final     Comment:     ADA Screening Guidelines:  5.7-6.4%  Consistent with prediabetes  >=6.5%  Consistent with diabetes    High levels of fetal hemoglobin interfere with the HbA1C  assay. Heterozygous hemoglobin variants (HbS, HgC, etc)do  not significantly interfere with this assay.   However, presence of multiple variants may affect accuracy.       Chemistry        Component Value Date/Time     07/16/2025 0807     01/31/2025 2121    K 4.7 07/16/2025 0807    K 3.6 01/31/2025 2121     07/16/2025 0807     01/31/2025 2121    CO2 26 07/16/2025 0807    CO2 21 (L) 01/31/2025 2121    BUN 15 07/16/2025 0807    CREATININE 1.0 07/16/2025 0807     (H)  "07/16/2025 0807     (H) 01/31/2025 2121        Component Value Date/Time    CALCIUM 9.3 07/16/2025 0807    CALCIUM 9.2 01/31/2025 2121    ALKPHOS 84 07/16/2025 0807    ALKPHOS 74 01/31/2025 2121    AST 15 07/16/2025 0807    AST 21 01/31/2025 2121    ALT 22 07/16/2025 0807    ALT 22 01/31/2025 2121    BILITOT 0.4 07/16/2025 0807    BILITOT 0.4 01/31/2025 2121    ESTGFRAFRICA >60.0 03/02/2022 1025    EGFRNONAA >60.0 03/02/2022 1025          Lab Results   Component Value Date    CHOL 249 (H) 07/16/2025     Lab Results   Component Value Date    HDL 39 (L) 07/16/2025     Lab Results   Component Value Date    LDLCALC 165.8 (H) 07/16/2025     Lab Results   Component Value Date    TRIG 221 (H) 07/16/2025     Lab Results   Component Value Date    CHOLHDL 15.7 (L) 07/16/2025     Lab Results   Component Value Date    MICALBCREAT 6.5 04/09/2024     Lab Results   Component Value Date    TSH 1.948 07/16/2025     No results found for: "[id number]"    Assessment:         1. Type 2 diabetes mellitus without complication, without long-term current use of insulin  tirzepatide (MOUNJARO) 2.5 mg/0.5 mL PnIj    tirzepatide (MOUNJARO) 5 mg/0.5 mL PnIj    rosuvastatin (CRESTOR) 10 MG tablet      2. Mixed hyperlipidemia                    Plan:         Cont metformin 1000 mg xr bid. --can take all at once if tolerated since he forgets second dose  Start mounjaro   Restart rosuvastatin 10 mg         ORDERS 07/18/2025      3 mo with fasting lipids, A1c, prior.  .    "

## 2025-08-07 ENCOUNTER — TELEPHONE (OUTPATIENT)
Dept: PHARMACY | Facility: CLINIC | Age: 55
End: 2025-08-07
Payer: COMMERCIAL

## 2025-08-07 NOTE — TELEPHONE ENCOUNTER
[facility]/Population Health Chart Review & Patient Outreach Details For Medication Adherence Project    Reason for Outreach Encounter: 3rd Party payor non-compliance report ([employer], [employer], C, etc)  2.  Patient Outreach Method: Reviewed Patient Chart  3.   Medication in question: rosuvastatin    LAST FILLED: 7/18/25 for 90 day supply  Hyperlipidemia Medications              rosuvastatin (CRESTOR) 10 MG tablet Take 1 tablet (10 mg total) by mouth once daily.               4.  Reviewed and or Updates Made To: Patient Chart  5. Outreach Outcomes and/or actions taken: Patient filled medication and is on track to be adherent

## 2025-08-19 ENCOUNTER — PATIENT MESSAGE (OUTPATIENT)
Dept: ADMINISTRATIVE | Facility: HOSPITAL | Age: 55
End: 2025-08-19
Payer: COMMERCIAL